# Patient Record
Sex: MALE | Race: WHITE | NOT HISPANIC OR LATINO | Employment: FULL TIME | URBAN - METROPOLITAN AREA
[De-identification: names, ages, dates, MRNs, and addresses within clinical notes are randomized per-mention and may not be internally consistent; named-entity substitution may affect disease eponyms.]

---

## 2020-01-13 ENCOUNTER — HOSPITAL ENCOUNTER (OUTPATIENT)
Dept: LAB | Facility: HOSPITAL | Age: 30
Discharge: HOME | End: 2020-01-13
Attending: FAMILY MEDICINE

## 2020-01-15 LAB — CONV NASH FIBROSURE: NORMAL

## 2022-12-01 ENCOUNTER — LAB (OUTPATIENT)
Dept: LAB | Facility: HOSPITAL | Age: 32
End: 2022-12-01

## 2022-12-01 ENCOUNTER — OFFICE VISIT (OUTPATIENT)
Dept: FAMILY MEDICINE CLINIC | Facility: CLINIC | Age: 32
End: 2022-12-01

## 2022-12-01 VITALS
RESPIRATION RATE: 19 BRPM | SYSTOLIC BLOOD PRESSURE: 106 MMHG | WEIGHT: 139.6 LBS | OXYGEN SATURATION: 98 % | HEIGHT: 71 IN | BODY MASS INDEX: 19.54 KG/M2 | HEART RATE: 83 BPM | TEMPERATURE: 98 F | DIASTOLIC BLOOD PRESSURE: 82 MMHG

## 2022-12-01 DIAGNOSIS — Z01.89 ROUTINE LAB DRAW: ICD-10-CM

## 2022-12-01 DIAGNOSIS — Z76.89 ESTABLISHING CARE WITH NEW DOCTOR, ENCOUNTER FOR: Primary | ICD-10-CM

## 2022-12-01 DIAGNOSIS — Z11.59 ENCOUNTER FOR HEPATITIS C SCREENING TEST FOR LOW RISK PATIENT: ICD-10-CM

## 2022-12-01 DIAGNOSIS — E78.1 HYPERTRIGLYCERIDEMIA: ICD-10-CM

## 2022-12-01 DIAGNOSIS — Z13.220 SCREENING FOR LIPID DISORDERS: ICD-10-CM

## 2022-12-01 DIAGNOSIS — R73.01 IMPAIRED FASTING GLUCOSE: ICD-10-CM

## 2022-12-01 DIAGNOSIS — F10.10 ALCOHOL ABUSE: ICD-10-CM

## 2022-12-01 LAB
BASOPHILS # BLD AUTO: 0.06 10*3/MM3 (ref 0–0.2)
BASOPHILS NFR BLD AUTO: 0.9 % (ref 0–1.5)
BILIRUB UR QL STRIP: NEGATIVE
CHOLEST SERPL-MCNC: 222 MG/DL (ref 0–200)
CLARITY UR: CLEAR
COLOR UR: YELLOW
DEPRECATED RDW RBC AUTO: 43.7 FL (ref 37–54)
EOSINOPHIL # BLD AUTO: 0.4 10*3/MM3 (ref 0–0.4)
EOSINOPHIL NFR BLD AUTO: 5.7 % (ref 0.3–6.2)
ERYTHROCYTE [DISTWIDTH] IN BLOOD BY AUTOMATED COUNT: 12.9 % (ref 12.3–15.4)
FOLATE SERPL-MCNC: 10.2 NG/ML (ref 4.78–24.2)
GLUCOSE UR STRIP-MCNC: NEGATIVE MG/DL
HCT VFR BLD AUTO: 46.3 % (ref 37.5–51)
HDLC SERPL-MCNC: 74 MG/DL (ref 40–60)
HGB BLD-MCNC: 15.3 G/DL (ref 13–17.7)
HGB UR QL STRIP.AUTO: NEGATIVE
IMM GRANULOCYTES # BLD AUTO: 0.02 10*3/MM3 (ref 0–0.05)
IMM GRANULOCYTES NFR BLD AUTO: 0.3 % (ref 0–0.5)
KETONES UR QL STRIP: NEGATIVE
LDLC SERPL CALC-MCNC: 121 MG/DL (ref 0–100)
LDLC/HDLC SERPL: 1.58 {RATIO}
LEUKOCYTE ESTERASE UR QL STRIP.AUTO: NEGATIVE
LYMPHOCYTES # BLD AUTO: 1.4 10*3/MM3 (ref 0.7–3.1)
LYMPHOCYTES NFR BLD AUTO: 20 % (ref 19.6–45.3)
MCH RBC QN AUTO: 30.4 PG (ref 26.6–33)
MCHC RBC AUTO-ENTMCNC: 33 G/DL (ref 31.5–35.7)
MCV RBC AUTO: 91.9 FL (ref 79–97)
MONOCYTES # BLD AUTO: 0.55 10*3/MM3 (ref 0.1–0.9)
MONOCYTES NFR BLD AUTO: 7.9 % (ref 5–12)
NEUTROPHILS NFR BLD AUTO: 4.57 10*3/MM3 (ref 1.7–7)
NEUTROPHILS NFR BLD AUTO: 65.2 % (ref 42.7–76)
NITRITE UR QL STRIP: NEGATIVE
NRBC BLD AUTO-RTO: 0 /100 WBC (ref 0–0.2)
PH UR STRIP.AUTO: 6.5 [PH] (ref 5–8)
PLATELET # BLD AUTO: 223 10*3/MM3 (ref 140–450)
PMV BLD AUTO: 10.3 FL (ref 6–12)
PROT UR QL STRIP: NEGATIVE
RBC # BLD AUTO: 5.04 10*6/MM3 (ref 4.14–5.8)
SP GR UR STRIP: 1.01 (ref 1–1.03)
TRIGL SERPL-MCNC: 155 MG/DL (ref 0–150)
TSH SERPL DL<=0.05 MIU/L-ACNC: 1.27 UIU/ML (ref 0.27–4.2)
UROBILINOGEN UR QL STRIP: NORMAL
VIT B12 BLD-MCNC: 533 PG/ML (ref 211–946)
VLDLC SERPL-MCNC: 27 MG/DL (ref 5–40)
WBC NRBC COR # BLD: 7 10*3/MM3 (ref 3.4–10.8)

## 2022-12-01 PROCEDURE — 83036 HEMOGLOBIN GLYCOSYLATED A1C: CPT

## 2022-12-01 PROCEDURE — 81003 URINALYSIS AUTO W/O SCOPE: CPT

## 2022-12-01 PROCEDURE — 82607 VITAMIN B-12: CPT

## 2022-12-01 PROCEDURE — 99204 OFFICE O/P NEW MOD 45 MIN: CPT

## 2022-12-01 PROCEDURE — 36415 COLL VENOUS BLD VENIPUNCTURE: CPT

## 2022-12-01 PROCEDURE — 80061 LIPID PANEL: CPT

## 2022-12-01 PROCEDURE — 82306 VITAMIN D 25 HYDROXY: CPT

## 2022-12-01 PROCEDURE — 82746 ASSAY OF FOLIC ACID SERUM: CPT

## 2022-12-01 PROCEDURE — 86803 HEPATITIS C AB TEST: CPT

## 2022-12-01 PROCEDURE — 80050 GENERAL HEALTH PANEL: CPT

## 2022-12-01 RX ORDER — PROPRANOLOL HYDROCHLORIDE 80 MG/1
TABLET ORAL
COMMUNITY
Start: 2020-10-10

## 2022-12-01 NOTE — ASSESSMENT & PLAN NOTE
Discussed with patient extensively about continued alcohol abstinence.  Did also discussed that it is important to take it day by day, do not try to look to far ahead, and that it is great that he has made it for 4 days.  Did provide patient to packets of resources, 1 for AA meetings that I do recommend that he attends, he can develop a good sponsor there, have some good support for people who have been through the program and can really relate to him.  Also gave him another packet, did advise him that if he has any urge to return back to alcohol, did give him a list of resources for different rehab facilities here in [facility].  Did encourage him that if he does have those urges, has difficulty in time controlling them, do recommend that he goes and be admitted to the rehab facilities for assistance.  Did also discussed with patient that we will obtain blood work, thereafter if no severe abnormalities are found, I am going to start him on Antabuse.  Discussed with him this medication, discussed with him its use, discussed with him its side effects for any alcohol consumption while on this medication.  Discussed the purpose of it.  We will also have patient follow-up in 2 weeks, sooner if needed.

## 2022-12-01 NOTE — ASSESSMENT & PLAN NOTE
We will repeat lipid panel, may consider starting patient on medication to lower this.  Did discuss with him the importance of diet changes, lifestyle modifications.

## 2022-12-01 NOTE — PROGRESS NOTES
Ghulam Guzman presents to [facility] with complaints of issues with alcohol abuse, patient is also here to establish as a new patient.      History of Present Illness  This is a 32-year-old male, past medical history significant for alcohol abuse, tremors, hypertriglyceridemia, who presents to the clinic with complaints of issues with alcohol abuse.    Alcohol abuse/tremors: States that he has had an issue with alcohol abuse since probably [id number].  States that he was 18 years old at the time, went to college, and had a pretty severe addiction to alcohol at that time.  States is only progressively gotten worse, and he is now at the point to where he is becoming extremely concerned as well as his family.  States that he currently drinks about a pint of whiskey nightly, will even drink more than that on the weekends.  States that he had his all-time low this past weekend, drank about 3/5 of whiskey, and does not remember anything else that happened this weekend.  States that he blacked out, ended up awake on his mom's couch getting IV fluids.  States that this was a big eye-opening event for him, states that he always has really wanted to come off the alcohol, but is never really had the motivation to do so.  States that he constantly has triggers, at this time of year is worse for him, as his father passed away 5 years ago in November.  States that he is trying to quit once before, only made it about 4 days, then went on a work trip and went right back to drinking.  States that he is very serious about it, knows that he needs to for various reasons, and is determined to stick with at this time.  Has never been to a rehab facility, has never been on medication to help him, but states that it is a big step for him to come to the office today to seek help.  For his tremors, states that he has had these for a long time, knows that they are alcohol induced, does see neurologist  "here in town and is currently on propranolol to help treat these.  States that he is currently been without any alcohol since Sunday, states this is day 4, this is the most that he is ever been without alcohol since 2008 2009.  Reports increased tremors, difficulty sleeping at night, but denies any seizures or seizure-like activity or any other associated symptoms.  Patient does state that he has really good family social support, has never attended a AA meeting either.    Hypertriglyceridemia: Does state that he has had elevated triglycerides in the past, states that this was done a few years ago, was never treated for this, never thought much of it, but is also not had blood work done in some time.  Is okay with us ordering this.    Patient did want flu shot today, but will defer until next appointment to make sure that he has abstained from alcohol and not actively withdrawing.  We will defer other vaccines/immunizations as well.  Otherwise, is up-to-date on preventative screenings.    Past Medical History:   Diagnosis Date   • Alcohol abuse      No past surgical history on file.    Social History     Socioeconomic History   • Marital status: Single   Tobacco Use   • Smoking status: Never   • Smokeless tobacco: Never   Vaping Use   • Vaping Use: Never used   Substance and Sexual Activity   • Alcohol use: Yes     Alcohol/week: 10.0 standard drinks     Types: 10 Shots of liquor per week   • Drug use: Never   • Sexual activity: Not Currently     Partners: Female     Birth control/protection: Condom     History reviewed. No pertinent family history.      Objective   Vital Signs:   /82   Pulse 83   Temp 98 °F (36.7 °C)   Resp 19   Ht 180.3 cm (71\")   Wt 63.3 kg (139 lb 9.6 oz)   SpO2 98%   BMI 19.47 kg/m²     Body mass index is 19.47 kg/m².    All labs, imaging, test results, and specialty provider notes reviewed with patient.       Physical Exam  Vitals reviewed.   Constitutional:       Appearance: " Normal appearance.   Cardiovascular:      Rate and Rhythm: Normal rate and regular rhythm.      Pulses: Normal pulses.      Heart sounds: Normal heart sounds.   Pulmonary:      Effort: Pulmonary effort is normal.      Breath sounds: Normal breath sounds.   Neurological:      General: No focal deficit present.      Mental Status: He is alert and oriented to person, place, and time.            Assessment and Plan:  Diagnoses and all orders for this visit:    1. Establishing care with new doctor, encounter for (Primary)    2. Routine lab draw  -     CBC Auto Differential; Future  -     Comprehensive Metabolic Panel; Future  -     TSH Rfx On Abnormal To Free T4; Future  -     Urinalysis With Culture If Indicated -; Future    3. Screening for lipid disorders  -     Lipid Panel; Future    4. Alcohol abuse  Assessment & Plan:  Discussed with patient extensively about continued alcohol abstinence.  Did also discussed that it is important to take it day by day, do not try to look to far ahead, and that it is great that he has made it for 4 days.  Did provide patient to packets of resources, 1 for AA meetings that I do recommend that he attends, he can develop a good sponsor there, have some good support for people who have been through the program and can really relate to him.  Also gave him another packet, did advise him that if he has any urge to return back to alcohol, did give him a list of resources for different rehab facilities here in town.  Did encourage him that if he does have those urges, has difficulty in time controlling them, do recommend that he goes and be admitted to the rehab facilities for assistance.  Did also discussed with patient that we will obtain blood work, thereafter if no severe abnormalities are found, I am going to start him on Antabuse.  Discussed with him this medication, discussed with him its use, discussed with him its side effects for any alcohol consumption while on this medication.   Discussed the purpose of it.  We will also have patient follow-up in 2 weeks, sooner if needed.    Orders:  -     Vitamin B12 & Folate; Future  -     Vitamin D,25-Hydroxy; Future    5. Impaired fasting glucose  -     Hemoglobin A1c; Future    6. Encounter for hepatitis C screening test for low risk patient  -     Hepatitis C antibody; Future    7. Hypertriglyceridemia  Assessment & Plan:  We will repeat lipid panel, may consider starting patient on medication to lower this.  Did discuss with him the importance of diet changes, lifestyle modifications.          Follow Up:  Return in about 2 weeks (around 12/15/2022).    Patient was given instructions and counseling regarding his condition or for health maintenance advice. Please see specific information pulled into the AVS if appropriate.

## 2022-12-02 DIAGNOSIS — F10.10 ALCOHOL ABUSE: Primary | ICD-10-CM

## 2022-12-02 LAB
25(OH)D3 SERPL-MCNC: 12.7 NG/ML (ref 30–100)
ALBUMIN SERPL-MCNC: 4.7 G/DL (ref 3.5–5.2)
ALBUMIN/GLOB SERPL: 1.4 G/DL
ALP SERPL-CCNC: 67 U/L (ref 39–117)
ALT SERPL W P-5'-P-CCNC: 31 U/L (ref 1–41)
ANION GAP SERPL CALCULATED.3IONS-SCNC: 11.1 MMOL/L (ref 5–15)
AST SERPL-CCNC: 28 U/L (ref 1–40)
BILIRUB SERPL-MCNC: 0.4 MG/DL (ref 0–1.2)
BUN SERPL-MCNC: 8 MG/DL (ref 6–20)
BUN/CREAT SERPL: 8 (ref 7–25)
CALCIUM SPEC-SCNC: 9.6 MG/DL (ref 8.6–10.5)
CHLORIDE SERPL-SCNC: 97 MMOL/L (ref 98–107)
CO2 SERPL-SCNC: 28.9 MMOL/L (ref 22–29)
CREAT SERPL-MCNC: 1 MG/DL (ref 0.76–1.27)
EGFRCR SERPLBLD CKD-EPI 2021: 102.6 ML/MIN/1.73
GLOBULIN UR ELPH-MCNC: 3.4 GM/DL
GLUCOSE SERPL-MCNC: 74 MG/DL (ref 65–99)
HBA1C MFR BLD: 4.9 % (ref 4.8–5.6)
HCV AB SER DONR QL: NORMAL
POTASSIUM SERPL-SCNC: 4.5 MMOL/L (ref 3.5–5.2)
PROT SERPL-MCNC: 8.1 G/DL (ref 6–8.5)
SODIUM SERPL-SCNC: 137 MMOL/L (ref 136–145)

## 2022-12-02 RX ORDER — DISULFIRAM 250 MG/1
250 TABLET ORAL DAILY
Qty: 30 TABLET | Refills: 1 | Status: SHIPPED | OUTPATIENT
Start: 2022-12-02 | End: 2022-12-20 | Stop reason: SDUPTHER

## 2022-12-20 ENCOUNTER — OFFICE VISIT (OUTPATIENT)
Dept: FAMILY MEDICINE CLINIC | Facility: CLINIC | Age: 32
End: 2022-12-20

## 2022-12-20 VITALS
HEIGHT: 71 IN | HEART RATE: 78 BPM | TEMPERATURE: 98 F | OXYGEN SATURATION: 99 % | RESPIRATION RATE: 20 BRPM | WEIGHT: 138.8 LBS | SYSTOLIC BLOOD PRESSURE: 102 MMHG | DIASTOLIC BLOOD PRESSURE: 82 MMHG | BODY MASS INDEX: 19.43 KG/M2

## 2022-12-20 DIAGNOSIS — R25.1 TREMOR: Primary | ICD-10-CM

## 2022-12-20 DIAGNOSIS — F10.10 ALCOHOL ABUSE: ICD-10-CM

## 2022-12-20 PROCEDURE — 99213 OFFICE O/P EST LOW 20 MIN: CPT

## 2022-12-20 RX ORDER — DISULFIRAM 250 MG/1
250 TABLET ORAL DAILY
Qty: 90 TABLET | Refills: 1 | Status: SHIPPED | OUTPATIENT
Start: 2022-12-20

## 2022-12-20 NOTE — PROGRESS NOTES
"Ghulam Guzman presents to [facility] who presents to the clinic for 2-week follow-up.      History of Present Illness  This is a 32-year-old male who presents to the clinic for 2-week follow-up.    Alcohol use: Patient states that he is now 23 days sober, this is the longest he is went a long time, and states that he is never felt better.  Patient states that the Antabuse is really helping him, because he knows that if he does take a drink of alcohol, he will immediately feel bad.  Patient states that previously he would use alcohol to try to feel good, and knows that if he was to drink now, that the Antabuse is definitely going to cause the opposite effect.  States that he feels so much better, he has noticed that he tremors that he has had are starting to improve as well, states that he can actually write with a pen and not have to type up things at work.  Definitely feels like his thinking is much clearer, does not has had much brain fog, and is really pleased with where he is at.  States that his family has seen a major improvement, his coworkers as well, is really proud of himself.  States that he also was planning on getting back into some nutrition and fitness, states that after the first of the year he is getting get in with his nutrition friend, and get back into that and hopefully build some muscle.  Patient states that he is feeling really good, is really happy with his progress, and is planning on continuing down this path.    The following portions of the patient's history were personally reviewed and updated as appropriate: allergies, current medications, past medical history, past surgical history, past family history, and past social history.       Objective   Vital Signs:   /82   Pulse 78   Temp 98 °F (36.7 °C)   Resp 20   Ht 180.3 cm (71\")   Wt 63 kg (138 lb 12.8 oz)   SpO2 99%   BMI 19.36 kg/m²     Body mass index is 19.36 kg/m².    All labs, " imaging, test results, and specialty provider notes reviewed with patient.     Physical Exam  Vitals reviewed.   Constitutional:       Appearance: Normal appearance.   Cardiovascular:      Rate and Rhythm: Normal rate and regular rhythm.      Pulses: Normal pulses.      Heart sounds: Normal heart sounds.   Pulmonary:      Effort: Pulmonary effort is normal.      Breath sounds: Normal breath sounds.   Neurological:      General: No focal deficit present.      Mental Status: He is alert and oriented to person, place, and time.            Assessment and Plan:  Diagnoses and all orders for this visit:    1. Tremor (Primary)    2. Alcohol abuse  -     disulfiram (Antabuse) 250 MG tablet; Take 1 tablet by mouth Daily.  Dispense: 90 tablet; Refill: 1      Patient doing really well with continued alcohol cessation, discussed with him that he does still have resources available here locally, did provide him a list of these at last visit.  We will continue patient on Antabuse, continue for the indefinite future as it is a good deterrent, keeps him from drinking alcohol.  Discussed with him that he can even consider once he is alcohol free for several months, even talk to his neurologist about coming off of the propranolol, as his tremors were likely related to the alcohol use.  Discussed with patient to continue with his good social support system, and that if he needs anything in the future, he is able to come by the office anytime for that support as well.    Follow Up:  Return in about 3 months (around 3/20/2023).    Patient was given instructions and counseling regarding his condition or for health maintenance advice. Please see specific information pulled into the AVS if appropriate.

## 2022-12-29 RX ORDER — METHYLPREDNISOLONE 4 MG/1
TABLET ORAL
Qty: 21 TABLET | Refills: 0 | Status: SHIPPED | OUTPATIENT
Start: 2022-12-29 | End: 2023-03-22

## 2023-01-04 ENCOUNTER — HOSPITAL ENCOUNTER (OUTPATIENT)
Dept: GENERAL RADIOLOGY | Facility: HOSPITAL | Age: 33
Discharge: HOME | End: 2023-01-04
Payer: COMMERCIAL

## 2023-01-04 ENCOUNTER — OFFICE VISIT (OUTPATIENT)
Dept: FAMILY MEDICINE CLINIC | Facility: CLINIC | Age: 33
End: 2023-01-04
Payer: COMMERCIAL

## 2023-01-04 VITALS
DIASTOLIC BLOOD PRESSURE: 80 MMHG | TEMPERATURE: 98.6 F | SYSTOLIC BLOOD PRESSURE: 114 MMHG | BODY MASS INDEX: 19.54 KG/M2 | RESPIRATION RATE: 19 BRPM | HEART RATE: 83 BPM | OXYGEN SATURATION: 94 % | WEIGHT: 139.6 LBS | HEIGHT: 71 IN

## 2023-01-04 DIAGNOSIS — M79.671 RIGHT FOOT PAIN: ICD-10-CM

## 2023-01-04 DIAGNOSIS — M79.674 TOE PAIN, RIGHT: Primary | ICD-10-CM

## 2023-01-04 DIAGNOSIS — M79.674 TOE PAIN, RIGHT: ICD-10-CM

## 2023-01-04 PROCEDURE — 73630 X-RAY EXAM OF FOOT: CPT

## 2023-01-04 PROCEDURE — 99213 OFFICE O/P EST LOW 20 MIN: CPT

## 2023-01-04 NOTE — PROGRESS NOTES
Ghulam Guzman presents to [facility] with complaints of right foot toe pain and swelling with associated right heel pain.      History of Present Illness  This is a 32-year-old male who presents to clinic with complaints of right foot toe pain and swelling with associated right heel pain.    Patient states that he does not recall an injury to this, states that he woke up 1 morning, and his toe next to his right great toe was pretty swollen, very painful, and red.  He contacted our office, I did treat him with a Medrol pack as I thought it could be related to either gout or an arthritis/joint flare, states that the prednisone pack really did make a big difference in helping control pain and swelling.  Patient states that his symptoms have persisted, now has some bruising around that toe, and even admits to some pain more toward the back part of his heel/foot.  Is concerned he could have a bone spur as well, does not recall any injury, did not hit it on anything, no reason for the symptoms to start.  States that he really just wants to make sure there is nothing acute going on there, pain wise he is doing okay on over-the-counter ibuprofen, but wants to make sure that it is okay for him to start working out as that was his plan after the new year.  Denies any fever/chills/body aches, denies any other associated symptoms.    The following portions of the patient's history were personally reviewed and updated as appropriate: allergies, current medications, past medical history, past surgical history, past family history, and past social history.       Objective   Vital Signs:   /80   Pulse 83   Temp 98.6 °F (37 °C)   Resp 19   Ht 180.3 cm (71\")   Wt 63.3 kg (139 lb 9.6 oz)   SpO2 94%   BMI 19.47 kg/m²     Body mass index is 19.47 kg/m².    All labs, imaging, test results, and specialty provider notes reviewed with patient.     Physical Exam  Vitals reviewed.    Constitutional:       Appearance: Normal appearance.   Musculoskeletal:      Right foot: Swelling present.      Comments: Ecchymosis noted on toe, mild swelling noted, no warmth.   Neurological:      General: No focal deficit present.      Mental Status: He is alert and oriented to person, place, and time.            Assessment and Plan:  Diagnoses and all orders for this visit:    1. Toe pain, right (Primary)  -     XR Foot 3+ View Right; Future    2. Right foot pain  -     XR Foot 3+ View Right; Future      Due to persistent swelling and symptoms, we will go ahead and obtain an x-ray to rule out any abnormalities there that could be contributing to patient's symptoms.  Still think that this is related to an arthritis/gout flare, discussed him to continue taking ibuprofen over-the-counter, but will rule out any other abnormalities via x-ray.    Follow Up:  No follow-ups on file.    Patient was given instructions and counseling regarding his condition or for health maintenance advice. Please see specific information pulled into the AVS if appropriate.

## 2023-03-22 ENCOUNTER — OFFICE VISIT (OUTPATIENT)
Dept: FAMILY MEDICINE CLINIC | Facility: CLINIC | Age: 33
End: 2023-03-22
Payer: COMMERCIAL

## 2023-03-22 VITALS
BODY MASS INDEX: 18.45 KG/M2 | HEART RATE: 95 BPM | HEIGHT: 71 IN | DIASTOLIC BLOOD PRESSURE: 72 MMHG | OXYGEN SATURATION: 98 % | SYSTOLIC BLOOD PRESSURE: 112 MMHG | RESPIRATION RATE: 19 BRPM | WEIGHT: 131.8 LBS

## 2023-03-22 DIAGNOSIS — E78.1 HYPERTRIGLYCERIDEMIA: ICD-10-CM

## 2023-03-22 DIAGNOSIS — G25.0 ESSENTIAL TREMOR: ICD-10-CM

## 2023-03-22 DIAGNOSIS — F10.10 ALCOHOL ABUSE: ICD-10-CM

## 2023-03-22 DIAGNOSIS — M19.90 ARTHRITIS: Primary | ICD-10-CM

## 2023-03-22 NOTE — PROGRESS NOTES
Ghulam Guzman presents to [facility] who presents to clinic for 3-month follow-up.      History of Present Illness  This is a 32-year-old male who presents to clinic for 3-month follow-up.    Alcohol use: Patient states that he is currently 115 days sober, states that he feels so much better, states that his sleeping patterns have actually regulated.  States that it did take about 2 to 2-1/2 months, but does finally feel like he is got a better sleep habit as well.  Has developed other good routines, is getting back into the gym, is more active and overall feels much better 2.  Patient states that he also follows up with his neurologist tomorrow, has been weaning himself off slowly of the propranolol, as he knows that the reason for his tremors was because of the alcohol.  He is good to try to come off of this completely.  Does remain on Antabuse currently, but states that he is probably getting caught stop this soon as he feels like he is got good routines and does not need it any longer.  States that he may go back on it around December, because he seems to have worsening issues with wanting to drink around the holidays.  Overall is doing really well.  No other new acute issues.    Right foot arthritis: Was found to have arthritis via x-ray at last visit.  Patient states that he does still have quite a bit of pain, but it is intermittent, and wants to hold off on any further work-up of this at this time as the x-ray was pretty expensive.  Has been taking ibuprofen intermittently, still has the pain periodically, and even at times whenever he is put a lot of pressure on his foot he notices a popping sensation.  States that he is doing pretty well with conservative measures, would like to hold off on any further assessment of this at this time.    The following portions of the patient's history were personally reviewed and updated as appropriate: allergies, current  "medications, past medical history, past surgical history, past family history, and past social history.       Objective   Vital Signs:   /72   Pulse 95   Resp 19   Ht 180.3 cm (71\")   Wt 59.8 kg (131 lb 12.8 oz)   SpO2 98%   BMI 18.38 kg/m²     Body mass index is 18.38 kg/m².    All labs, imaging, test results, and specialty provider notes reviewed with patient.     Physical Exam  Vitals reviewed.   Constitutional:       Appearance: Normal appearance.   Cardiovascular:      Rate and Rhythm: Normal rate and regular rhythm.      Pulses: Normal pulses.      Heart sounds: Normal heart sounds.   Pulmonary:      Effort: Pulmonary effort is normal.      Breath sounds: Normal breath sounds.   Neurological:      General: No focal deficit present.      Mental Status: He is alert and oriented to person, place, and time.                Assessment and Plan:  Diagnoses and all orders for this visit:    1. Arthritis (Primary)  Comments:  Has had x-ray, do think it is likely gout, continue with ibuprofen as needed.  Can consider blood work to test for gout in future.    2. Alcohol abuse  Comments:  Doing well, has been sober for 115 days.  Continue Antabuse as needed.    3. Hypertriglyceridemia  Comments:  We will repeat lipid panel prior to next visit.  Continue lifestyle modifications, diet adjustments and increased exercise.    4. Essential tremor  Comments:  Continued on propranolol, follow-up with neurology.          Follow Up:  Return in about 9 months (around 12/22/2023).    Patient was given instructions and counseling regarding his condition or for health maintenance advice. Please see specific information pulled into the AVS if appropriate.       "

## 2023-08-15 ENCOUNTER — OFFICE VISIT (OUTPATIENT)
Dept: FAMILY MEDICINE CLINIC | Facility: CLINIC | Age: 33
End: 2023-08-15
Payer: COMMERCIAL

## 2023-08-15 VITALS
SYSTOLIC BLOOD PRESSURE: 126 MMHG | HEART RATE: 68 BPM | WEIGHT: 131.8 LBS | DIASTOLIC BLOOD PRESSURE: 88 MMHG | HEIGHT: 71 IN | BODY MASS INDEX: 18.45 KG/M2 | TEMPERATURE: 97.7 F | OXYGEN SATURATION: 98 %

## 2023-08-15 DIAGNOSIS — F10.982 ALCOHOL-INDUCED INSOMNIA: Primary | ICD-10-CM

## 2023-08-15 DIAGNOSIS — G25.0 ESSENTIAL TREMOR: ICD-10-CM

## 2023-08-15 DIAGNOSIS — F10.10 ALCOHOL ABUSE: ICD-10-CM

## 2023-08-15 PROCEDURE — 99213 OFFICE O/P EST LOW 20 MIN: CPT

## 2023-08-15 RX ORDER — PROPRANOLOL HYDROCHLORIDE 120 MG/1
120 CAPSULE, EXTENDED RELEASE ORAL DAILY
COMMUNITY

## 2023-08-15 RX ORDER — AMITRIPTYLINE HYDROCHLORIDE 10 MG/1
10-20 TABLET, FILM COATED ORAL NIGHTLY
Qty: 60 TABLET | Refills: 2 | Status: SHIPPED | OUTPATIENT
Start: 2023-08-15

## 2023-08-15 NOTE — PROGRESS NOTES
"Ghulam Guzman presents to [facility] who presents to the clinic for 1 month follow-up.      History of Present Illness  This is a 33-year-old male who presents to the clinic for 1 month follow-up.    Patient states that he has only had 1 drink in the past 8 days, states that the 1 drink was at a social event during a football game, and patient states that he is really proud of himself.  States that the insomnia though, has not really improved.  He did try the hydroxyzine that was prescribed at last visit, states that he started with 1 tablet, did not do anything for him, then started to take 2.  Patient states that it did help him fall asleep, but he was in a very light sleep and any sudden movement seem to trigger him to wake up.  Patient also states that he was having more of a dreams, and thus was not getting a good nights rest.  States that he is really trying hard to not go back to alcohol as a form of helping him sleep, and thus would like to try another medication to help him sleep in the meantime.  Has not ever tried any other medication, has tried melatonin over-the-counter as well with no resolve.  States that he also is still taking his propranolol for his tremors, states that he is back up to 120 mg due to the severity of his tremors related to the alcohol.  He denies any other symptoms.    The following portions of the patient's history were personally reviewed and updated as appropriate: allergies, current medications, past medical history, past surgical history, past family history, and past social history.       Objective   Vital Signs:   /88 (BP Location: Left arm, Patient Position: Sitting, Cuff Size: Adult)   Pulse 68   Temp 97.7 øF (36.5 øC) (Temporal)   Ht 180.3 cm (71\")   Wt 59.8 kg (131 lb 12.8 oz)   SpO2 98%   BMI 18.38 kg/mý     Body mass index is 18.38 kg/mý.    All labs, imaging, test results, and specialty provider notes reviewed with " patient.     Physical Exam  Vitals reviewed.   Constitutional:       Appearance: Normal appearance.   Cardiovascular:      Rate and Rhythm: Normal rate and regular rhythm.      Pulses: Normal pulses.      Heart sounds: Normal heart sounds.   Pulmonary:      Effort: Pulmonary effort is normal.      Breath sounds: Normal breath sounds.   Neurological:      General: No focal deficit present.      Mental Status: He is alert and oriented to person, place, and time.                       Assessment and Plan:  Diagnoses and all orders for this visit:    1. Alcohol-induced insomnia (Primary)  Comments:  At this point we will try on amitriptyline and to help with sleep and may be tremors.  Discussed medication, side effects, and use.  Orders:  -     amitriptyline (ELAVIL) 10 MG tablet; Take 1-2 tablets by mouth Every Night.  Dispense: 60 tablet; Refill: 2    2. Essential tremor  -     amitriptyline (ELAVIL) 10 MG tablet; Take 1-2 tablets by mouth Every Night.  Dispense: 60 tablet; Refill: 2    3. Alcohol abuse  Comments:  Discussed continued alcohol cessation, as it is likely contributing to insomnia.  Once again, patient does not want Antabuse.          Follow Up:  Return in about 4 weeks (around 9/12/2023).    Patient was given instructions and counseling regarding his condition or for health maintenance advice. Please see specific information pulled into the AVS if appropriate.

## 2023-09-14 ENCOUNTER — OFFICE VISIT (OUTPATIENT)
Dept: FAMILY MEDICINE CLINIC | Facility: CLINIC | Age: 33
End: 2023-09-14
Payer: COMMERCIAL

## 2023-09-14 VITALS
SYSTOLIC BLOOD PRESSURE: 122 MMHG | DIASTOLIC BLOOD PRESSURE: 70 MMHG | TEMPERATURE: 98.2 F | OXYGEN SATURATION: 98 % | BODY MASS INDEX: 18.56 KG/M2 | WEIGHT: 132.6 LBS | HEIGHT: 71 IN | HEART RATE: 80 BPM

## 2023-09-14 DIAGNOSIS — G25.0 ESSENTIAL TREMOR: ICD-10-CM

## 2023-09-14 DIAGNOSIS — F10.982 ALCOHOL-INDUCED INSOMNIA: Primary | ICD-10-CM

## 2023-09-14 PROCEDURE — 99213 OFFICE O/P EST LOW 20 MIN: CPT

## 2023-09-14 RX ORDER — QUETIAPINE FUMARATE 25 MG/1
25-50 TABLET, FILM COATED ORAL NIGHTLY
Qty: 180 TABLET | Refills: 1 | Status: SHIPPED | OUTPATIENT
Start: 2023-09-14

## 2023-09-14 NOTE — PROGRESS NOTES
"Ghulam Guzman presents to [facility] presents to the clinic for 4-week follow-up.      Insomnia    This is a 33-year-old male who presents to the clinic for 4-week follow-up.    Alcoholism/insomnia: Patient states that he is done pretty well with abstaining from alcohol, states that he is no longer had it in his house, and that he feels like he is doing pretty well.  Patient states that he is not getting any sleep, states that the medication that we started him on has not helped, he is even taken 2 of them at one time and is still not made much of a difference.  States that he has trouble falling asleep, and then will wake up from 2 to 3 AM, and then would not be able to go back to sleep after that.  States that it is really impacting him, and that he is afraid that if he does not start getting better sleep that he is can go back to drinking alcohol.  Denies any other issues.    The following portions of the patient's history were personally reviewed and updated as appropriate: allergies, current medications, past medical history, past surgical history, past family history, and past social history.       Objective   Vital Signs:   /70 (BP Location: Left arm, Patient Position: Sitting)   Pulse 80   Temp 98.2 °F (36.8 °C) (Temporal)   Ht 180.3 cm (71\")   Wt 60.1 kg (132 lb 9.6 oz)   SpO2 98%   BMI 18.49 kg/m²     Body mass index is 18.49 kg/m².    All labs, imaging, test results, and specialty provider notes reviewed with patient.     Physical Exam  Vitals reviewed.   Constitutional:       Appearance: Normal appearance.   Cardiovascular:      Rate and Rhythm: Normal rate and regular rhythm.      Pulses: Normal pulses.      Heart sounds: Normal heart sounds.   Pulmonary:      Effort: Pulmonary effort is normal.      Breath sounds: Normal breath sounds.   Neurological:      General: No focal deficit present.      Mental Status: He is alert and oriented to person, " place, and time.                BMI is below normal parameters (malnutrition). Recommendations: treating the underlying disease process            Assessment and Plan:  Diagnoses and all orders for this visit:    1. Alcohol-induced insomnia (Primary)  -     QUEtiapine (SEROquel) 25 MG tablet; Take 1-2 tablets by mouth Every Night.  Dispense: 180 tablet; Refill: 1    2. Essential tremor    At this point, we will try him on a Seroquel.  Hopefully this will benefit him from an anxiety/depression standpoint as he does probably have some of this underlying as well that leads him to drinking alcohol.  Discussed with him this medication, possible side effects, and use.  We will start with 1 tablet nightly, but then discussed that he can go up to 2 tablets nightly to see if that will improve.  Patient to let me know in 1 month how he is doing with this new medication.      Follow Up:  Return in about 3 months (around 12/14/2023).    Patient was given instructions and counseling regarding his condition or for health maintenance advice. Please see specific information pulled into the AVS if appropriate.

## 2023-10-30 RX ORDER — METHYLPREDNISOLONE 4 MG/1
TABLET ORAL
Qty: 21 TABLET | Refills: 0 | Status: SHIPPED | OUTPATIENT
Start: 2023-10-30

## 2023-11-20 RX ORDER — METHYLPREDNISOLONE 4 MG/1
TABLET ORAL
Qty: 21 TABLET | Refills: 0 | Status: SHIPPED | OUTPATIENT
Start: 2023-11-20

## 2023-12-14 ENCOUNTER — OFFICE VISIT (OUTPATIENT)
Dept: FAMILY MEDICINE CLINIC | Facility: CLINIC | Age: 33
End: 2023-12-14
Payer: MEDICAID

## 2023-12-14 VITALS
HEART RATE: 98 BPM | WEIGHT: 133.2 LBS | HEIGHT: 71 IN | OXYGEN SATURATION: 97 % | BODY MASS INDEX: 18.65 KG/M2 | SYSTOLIC BLOOD PRESSURE: 102 MMHG | TEMPERATURE: 97.4 F | DIASTOLIC BLOOD PRESSURE: 68 MMHG

## 2023-12-14 DIAGNOSIS — M10.49 OTHER SECONDARY ACUTE GOUT OF MULTIPLE SITES: ICD-10-CM

## 2023-12-14 DIAGNOSIS — G25.0 ESSENTIAL TREMOR: ICD-10-CM

## 2023-12-14 DIAGNOSIS — F10.982 ALCOHOL-INDUCED INSOMNIA: Primary | ICD-10-CM

## 2023-12-14 DIAGNOSIS — Z00.00 ANNUAL PHYSICAL EXAM: ICD-10-CM

## 2023-12-14 PROBLEM — M10.9 GOUT: Status: ACTIVE | Noted: 2023-12-14

## 2023-12-14 RX ORDER — QUETIAPINE FUMARATE 25 MG/1
25-50 TABLET, FILM COATED ORAL NIGHTLY
Qty: 180 TABLET | Refills: 1 | Status: SHIPPED | OUTPATIENT
Start: 2023-12-14

## 2023-12-14 NOTE — PROGRESS NOTES
Ghulam Guzman presents to [facility] who presents to the clinic for 3-month follow-up.      History of Present Illness  This is a 33-year-old male who presents to clinic for 3-month follow-up.    Alcohol abuse/insomnia/gout: Patient states that he has completely quit alcohol use since the beginning of November.  Patient states that he found that it was definitely contributing to a lot of his chronic issues, and at this point, has decided to quit all alcohol.  About 1 years ago, he did come and see me, and was ready to quit alcohol.  Had quit hard alcohol at that time, but over the past couple of months he had gradually went back to drinking beer only.  Patient states that he has completely quit all alcohol now, feels as though it has caused issues with gout flaring especially in his right hand.  Patient states that his right hand knuckle would get extremely swollen, he was given to Medrol Dosepak's which did help a little bit, but then ultimately eliminating things from his diet that contributed to the gout is what ultimately is helping things get better.  Patient states that he has completely gotten rid of all things purine, has limited all alcohol, and is working really hard to control this.  Patient also states he is doing really well on the Seroquel, states that this is the best medication and is actually getting about 10 hours of sleep.  No new issues there, would like to continue on this medication.  Also was continuing on his propranolol for his tremors.    Patient does not wish to have blood work done at this time, otherwise up-to-date on other preventative screenings.    The following portions of the patient's history were personally reviewed and updated as appropriate: allergies, current medications, past medical history, past surgical history, past family history, and past social history.       Objective   Vital Signs:   /68 (BP Location: Left arm, Patient  "Position: Sitting, Cuff Size: Adult)   Pulse 98   Temp 97.4 °F (36.3 °C) (Temporal)   Ht 180.3 cm (71\")   Wt 60.4 kg (133 lb 3.2 oz)   SpO2 97%   BMI 18.58 kg/m²     Body mass index is 18.58 kg/m².    All labs, imaging, test results, and specialty provider notes reviewed with patient.     Physical Exam  Vitals reviewed.   Constitutional:       Appearance: Normal appearance.   Cardiovascular:      Rate and Rhythm: Normal rate and regular rhythm.      Pulses: Normal pulses.      Heart sounds: Normal heart sounds.   Pulmonary:      Effort: Pulmonary effort is normal.      Breath sounds: Normal breath sounds.   Neurological:      General: No focal deficit present.      Mental Status: He is alert and oriented to person, place, and time.                  BMI is within normal parameters. No other follow-up for BMI required.            Assessment and Plan:  Diagnoses and all orders for this visit:    1. Alcohol-induced insomnia (Primary)  Assessment & Plan:  Doing well with alcohol cessation and on Seroquel.  Will continue at current dose.    Orders:  -     QUEtiapine (SEROquel) 25 MG tablet; Take 1-2 tablets by mouth Every Night.  Dispense: 180 tablet; Refill: 1    2. Essential tremor    3. Other secondary acute gout of multiple sites  Assessment & Plan:  Continue with low purine diet, alcohol cessation is very important as this is likely trigger for worsening gout symptoms.  Could consider allopurinol in the future if gout becomes a worsening issue.      4. Annual physical exam      Anticipatory Guidelines discussed with patient.    Discussed getting adequate exercise, reduced TV/electronic time, car safety including seat belt use, sexual activity (if applicable), and smoking/alcohol use (if applicable).      Follow Up:  Return in about 6 months (around 6/14/2024).    Patient was given instructions and counseling regarding his condition or for health maintenance advice. Please see specific information pulled into " the AVS if appropriate.

## 2023-12-14 NOTE — ASSESSMENT & PLAN NOTE
Continue with low purine diet, alcohol cessation is very important as this is likely trigger for worsening gout symptoms.  Could consider allopurinol in the future if gout becomes a worsening issue.

## 2024-02-15 RX ORDER — METHYLPREDNISOLONE 4 MG/1
TABLET ORAL
Qty: 21 TABLET | Refills: 0 | Status: SHIPPED | OUTPATIENT
Start: 2024-02-15

## 2024-02-27 DIAGNOSIS — M79.674 TOE PAIN, RIGHT: Primary | ICD-10-CM

## 2024-02-27 DIAGNOSIS — M1A.2791: ICD-10-CM

## 2024-03-08 ENCOUNTER — HOSPITAL ENCOUNTER (OUTPATIENT)
Dept: MRI IMAGING | Facility: HOSPITAL | Age: 34
Discharge: HOME | End: 2024-03-08
Payer: MEDICAID

## 2024-03-08 ENCOUNTER — OFFICE VISIT (OUTPATIENT)
Dept: PODIATRY | Facility: CLINIC | Age: 34
End: 2024-03-08
Payer: MEDICAID

## 2024-03-08 VITALS
OXYGEN SATURATION: 99 % | SYSTOLIC BLOOD PRESSURE: 147 MMHG | TEMPERATURE: 98.4 F | WEIGHT: 139 LBS | BODY MASS INDEX: 19.39 KG/M2 | DIASTOLIC BLOOD PRESSURE: 95 MMHG | HEART RATE: 86 BPM

## 2024-03-08 DIAGNOSIS — M86.172 OTHER ACUTE OSTEOMYELITIS OF LEFT FOOT: ICD-10-CM

## 2024-03-08 DIAGNOSIS — M79.672 FOOT PAIN, LEFT: ICD-10-CM

## 2024-03-08 DIAGNOSIS — M10.9 ACUTE GOUT INVOLVING TOE OF LEFT FOOT, UNSPECIFIED CAUSE: Primary | ICD-10-CM

## 2024-03-08 PROCEDURE — 0 GADOBENATE DIMEGLUMINE 529 MG/ML SOLUTION: Performed by: PODIATRIST

## 2024-03-08 PROCEDURE — 73720 MRI LWR EXTREMITY W/O&W/DYE: CPT

## 2024-03-08 PROCEDURE — A9577 INJ MULTIHANCE: HCPCS | Performed by: PODIATRIST

## 2024-03-08 RX ORDER — DOXYCYCLINE HYCLATE 100 MG/1
100 CAPSULE ORAL 2 TIMES DAILY
Qty: 28 CAPSULE | Refills: 0 | Status: SHIPPED | OUTPATIENT
Start: 2024-03-08 | End: 2024-03-22

## 2024-03-08 RX ADMIN — GADOBENATE DIMEGLUMINE 15 ML: 529 INJECTION, SOLUTION INTRAVENOUS at 10:35

## 2024-03-08 NOTE — PROGRESS NOTES
[facility] - PODIATRY    Today's Date: 03/08/24    Patient Name: Ghulam Guzman  MRN: [id number]  CSN: [id number]  PCP: Flora Franco APRN  Referring Provider: Flora Franco APRN    SUBJECTIVE     Chief Complaint   Patient presents with    Left Foot - Establish Care, Pain, Gout    Right Foot - Establish Care, Pain, Gout     HPI: Ghulam Guzman, a 33 y.o.male, presents to clinic.    New, Established, New Problem: New    Location: Left hallux    Duration:  one week    Onset: Insidious    Nature: Patient states having gout at which started on his right foot 2 weeks ago in his left foot started 1 week ago.  States he was treated with p.o. prednisone but his left foot has continued to be a problem    Aggravating factors:  Patient relates pain is aggravated by shoe gear and ambulation.      Patient denies any fevers, chills, nausea, vomiting, shortness of breath, nor any other constitutional signs nor symptoms.    No other pedal complaints at this time.    Past Medical History:   Diagnosis Date    Alcohol abuse      History reviewed. No pertinent surgical history.  History reviewed. No pertinent family history.  Social History     Socioeconomic History    Marital status: Single   Tobacco Use    Smoking status: Never     Passive exposure: Never    Smokeless tobacco: Never   Vaping Use    Vaping status: Never Used   Substance and Sexual Activity    Alcohol use: Not Currently     Comment: Sober 39 days Stopped drinking 11/27/2022    Drug use: Never    Sexual activity: Not Currently     Partners: Female     Birth control/protection: Condom     No Known Allergies  Current Outpatient Medications   Medication Sig Dispense Refill    propranolol LA (INDERAL LA) 120 MG 24 hr capsule Take 1 capsule by mouth Daily.      QUEtiapine (SEROquel) 25 MG tablet Take 1-2 tablets by mouth Every Night. 180 tablet 1    doxycycline (VIBRAMYCIN) 100 MG capsule Take 1 capsule by mouth 2 (Two) Times a Day for  14 days. 28 capsule 0    methylPREDNISolone (MEDROL) 4 MG dose pack Take as directed on package instructions. (Patient not taking: Reported on 3/8/2024) 21 tablet 0     No current facility-administered medications for this visit.     Review of Systems   Constitutional: Negative.    Musculoskeletal:         Left great toe   All other systems reviewed and are negative.      OBJECTIVE     Vitals:    03/08/24 0741   BP: 147/95   Pulse: 86   Temp: 98.4 °F (36.9 °C)   SpO2: 99%       WBC   Date Value Ref Range Status   12/01/2022 7.00 3.40 - 10.80 10*3/mm3 Final     RBC   Date Value Ref Range Status   12/01/2022 5.04 4.14 - 5.80 10*6/mm3 Final     Hemoglobin   Date Value Ref Range Status   12/01/2022 15.3 13.0 - 17.7 g/dL Final     Hematocrit   Date Value Ref Range Status   12/01/2022 46.3 37.5 - 51.0 % Final     MCV   Date Value Ref Range Status   12/01/2022 91.9 79.0 - 97.0 fL Final     MCH   Date Value Ref Range Status   12/01/2022 30.4 26.6 - 33.0 pg Final     MCHC   Date Value Ref Range Status   12/01/2022 33.0 31.5 - 35.7 g/dL Final     RDW   Date Value Ref Range Status   12/01/2022 12.9 12.3 - 15.4 % Final     RDW-SD   Date Value Ref Range Status   12/01/2022 43.7 37.0 - 54.0 fl Final     MPV   Date Value Ref Range Status   12/01/2022 10.3 6.0 - 12.0 fL Final     Platelets   Date Value Ref Range Status   12/01/2022 223 140 - 450 10*3/mm3 Final     Neutrophil %   Date Value Ref Range Status   12/01/2022 65.2 42.7 - 76.0 % Final     Lymphocyte %   Date Value Ref Range Status   12/01/2022 20.0 19.6 - 45.3 % Final     Monocyte %   Date Value Ref Range Status   12/01/2022 7.9 5.0 - 12.0 % Final     Eosinophil %   Date Value Ref Range Status   12/01/2022 5.7 0.3 - 6.2 % Final     Basophil %   Date Value Ref Range Status   12/01/2022 0.9 0.0 - 1.5 % Final     Immature Grans %   Date Value Ref Range Status   12/01/2022 0.3 0.0 - 0.5 % Final     Neutrophils, Absolute   Date Value Ref Range Status   12/01/2022 4.57 1.70 -  7.00 10*3/mm3 Final     Lymphocytes, Absolute   Date Value Ref Range Status   12/01/2022 1.40 0.70 - 3.10 10*3/mm3 Final     Monocytes, Absolute   Date Value Ref Range Status   12/01/2022 0.55 0.10 - 0.90 10*3/mm3 Final     Eosinophils, Absolute   Date Value Ref Range Status   12/01/2022 0.40 0.00 - 0.40 10*3/mm3 Final     Basophils, Absolute   Date Value Ref Range Status   12/01/2022 0.06 0.00 - 0.20 10*3/mm3 Final     Immature Grans, Absolute   Date Value Ref Range Status   12/01/2022 0.02 0.00 - 0.05 10*3/mm3 Final     nRBC   Date Value Ref Range Status   12/01/2022 0.0 0.0 - 0.2 /100 WBC Final         Lab Results   Component Value Date    GLUCOSE 74 12/01/2022    BUN 8 12/01/2022    CREATININE 1.00 12/01/2022    EGFR 102.6 12/01/2022    BCR 8.0 12/01/2022    K 4.5 12/01/2022    CO2 28.9 12/01/2022    CALCIUM 9.6 12/01/2022    ALBUMIN 4.70 12/01/2022    BILITOT 0.4 12/01/2022    AST 28 12/01/2022    ALT 31 12/01/2022       Patient seen in no apparent distress.      PHYSICAL EXAM:     Foot/Ankle Exam    GENERAL  Appearance:  appears stated age  Orientation:  AAOx3  Affect:  appropriate  Gait:  unimpaired  Assistance:  independent  Right shoe gear: casual shoe  Left shoe gear: casual shoe    VASCULAR     Right Foot Vascularity   Normal vascular exam    Dorsalis pedis:  2+  Posterior tibial:  2+  Skin temperature:  warm  Edema grading:  None  CFT:  < 3 seconds  Pedal hair growth:  Present  Varicosities:  none     Left Foot Vascularity   Normal vascular exam    Dorsalis pedis:  2+  Posterior tibial:  2+  Skin temperature:  warm  Edema grading:  None  CFT:  < 3 seconds  Pedal hair growth:  Present  Varicosities:  none     NEUROLOGIC     Right Foot Neurologic   Normal sensation    Light touch sensation: normal  Vibratory sensation: normal  Hot/Cold sensation: normal  Protective Sensation using [place]-Dre Monofilament:   Sites intact: 10  Sites tested: 10     Left Foot Neurologic   Normal sensation    Light touch  sensation: normal  Vibratory sensation: normal  Hot/Cold sensation:  normal  Protective Sensation using [place]-Dre Monofilament:   Sites intact: 10  Sites tested: 10    MUSCULOSKELETAL     Left Foot Musculoskeletal   Tenderness:  toe 1 tenderness    MUSCLE STRENGTH     Right Foot Muscle Strength   Foot dorsiflexion:  4  Foot plantar flexion:  4  Foot inversion:  4  Foot eversion:  4     Left Foot Muscle Strength   Foot dorsiflexion:  4  Foot plantar flexion:  4  Foot inversion:  4  Foot eversion:  4    RANGE OF MOTION     Right Foot Range of Motion   Foot and ankle ROM within normal limits       Left Foot Range of Motion   Foot and ankle ROM within normal limits      DERMATOLOGIC      Right Foot Dermatologic   Skin  Right foot skin is intact.      Left Foot Dermatologic   Skin  Positive for cellulitis and erythema.       RADIOLOGY:      XR Foot 3+ View Left    Result Date: 3/8/2024  Narrative: IN-OFFICE IMAGING:  Standing, weightbearing, 3 view, AP, MO, Lateral, Left foot Indication:  Foot Pain Findings: Osteolytic changes seen in the lateral aspect of the proximal phalanx of the hallux periosteal changes.  These are concerning for osteomyelitis.  Otherwise, no periosteal reactions nor osteolytic changes seen.  No occult fractures seen. Comparison: No comparison views available. Dr. Loera called and spoke with Dr. Tal ePpper, radiologist with [facility].  Dr. Pepper reviewed these images and states agreement with these findings.     ASSESSMENT/PLAN     Diagnoses and all orders for this visit:    1. Acute gout involving toe of left foot, unspecified cause (Primary)    2. Other acute osteomyelitis of left foot  -     Cancel: MRI Foot Left With & Without Contrast; Future  -     MRI Foot Left With & Without Contrast; Future  -     doxycycline (VIBRAMYCIN) 100 MG capsule; Take 1 capsule by mouth 2 (Two) Times a Day for 14 days.  Dispense: 28 capsule; Refill: 0    3. Foot pain,  left        Comprehensive lower extremity examination and evaluation was performed.    Discussed findings and treatment plan including risks, benefits, and treatment options with patient in detail. Patient agreed with treatment plan.    Medications and allergies reviewed.  Reviewed available lab values along with other pertinent labs.  These were discussed with the patient.    An After Visit Summary was printed and given to the patient at discharge, including (if requested) any available informative/educational handouts regarding diagnosis, treatment, or medications. All questions were answered to patient/family satisfaction. Should symptoms fail to improve or worsen they agree to call or return to clinic or to go to the Emergency Department. Discussed the importance of following up with any needed screening tests/labs/specialist appointments and any requested follow-up recommended by me today. Importance of maintaining follow-up discussed and patient accepts that missed appointments can delay diagnosis and potentially lead to worsening of conditions.    Return for MRI f/u., or sooner if acute issues arise.    This document has been electronically signed by Howard Loera DPM on March 8, 2024 08:54 EST

## 2024-03-08 NOTE — LETTER
March 8, 2024       No Recipients    Patient: Ghulam Guzman   YOB: 1990   Date of Visit: 3/8/2024       Dear TAMIKO Santiago:    Thank you for referring Ghulam Guzman to me for evaluation. Below are the relevant portions of my assessment and plan of care.    Encounter Diagnosis and Orders:  Diagnoses and all orders for this visit:    1. Acute gout involving toe of left foot, unspecified cause (Primary)    2. Other acute osteomyelitis of left foot  -     Cancel: MRI Foot Left With & Without Contrast; Future  -     MRI Foot Left With & Without Contrast; Future  -     doxycycline (VIBRAMYCIN) 100 MG capsule; Take 1 capsule by mouth 2 (Two) Times a Day for 14 days.  Dispense: 28 capsule; Refill: 0    3. Foot pain, left        If you have questions, please do not hesitate to call me. I look forward to following Ghulam along with you.         Sincerely,        Howard Loera DPM        CC:   No Recipients

## 2024-03-11 ENCOUNTER — OFFICE VISIT (OUTPATIENT)
Dept: PODIATRY | Facility: CLINIC | Age: 34
End: 2024-03-11
Payer: MEDICAID

## 2024-03-11 VITALS
BODY MASS INDEX: 19.39 KG/M2 | SYSTOLIC BLOOD PRESSURE: 128 MMHG | WEIGHT: 139 LBS | DIASTOLIC BLOOD PRESSURE: 91 MMHG | HEART RATE: 86 BPM | OXYGEN SATURATION: 97 % | TEMPERATURE: 98.6 F

## 2024-03-11 DIAGNOSIS — M10.9 ACUTE GOUT INVOLVING TOE OF LEFT FOOT, UNSPECIFIED CAUSE: Primary | ICD-10-CM

## 2024-03-11 DIAGNOSIS — M79.672 FOOT PAIN, LEFT: ICD-10-CM

## 2024-03-11 PROCEDURE — 1160F RVW MEDS BY RX/DR IN RCRD: CPT | Performed by: PODIATRIST

## 2024-03-11 PROCEDURE — 1159F MED LIST DOCD IN RCRD: CPT | Performed by: PODIATRIST

## 2024-03-11 PROCEDURE — 99213 OFFICE O/P EST LOW 20 MIN: CPT | Performed by: PODIATRIST

## 2024-03-11 RX ORDER — METHYLPREDNISOLONE 4 MG/1
TABLET ORAL
Qty: 21 TABLET | Refills: 0 | Status: SHIPPED | OUTPATIENT
Start: 2024-03-11

## 2024-03-11 NOTE — PROGRESS NOTES
[facility] - PODIATRY    Today's Date: 03/11/24    Patient Name: Ghulam Guzman  MRN: [id number]  CSN: [id number]  PCP: Flora Franco APRN  Referring Provider: No ref. provider found    SUBJECTIVE     Chief Complaint   Patient presents with    Left Foot - Follow-up, Osteomyelitis      Here for MRI results     HPI: Ghulam Guzman, a 33 y.o.male, presents to clinic.    New, Established, New Problem: est    Location: Left hallux    Duration:  one week    Onset: Insidious    Aggravating factors:  Patient relates pain is aggravated by shoe gear and ambulation.      Patient denies any fevers, chills, nausea, vomiting, shortness of breath, nor any other constitutional signs nor symptoms.    No other pedal complaints at this time.    Past Medical History:   Diagnosis Date    Alcohol abuse      History reviewed. No pertinent surgical history.  History reviewed. No pertinent family history.  Social History     Socioeconomic History    Marital status: Single   Tobacco Use    Smoking status: Never     Passive exposure: Never    Smokeless tobacco: Never   Vaping Use    Vaping status: Never Used   Substance and Sexual Activity    Alcohol use: Not Currently     Comment: Sober 39 days Stopped drinking 11/27/2022    Drug use: Never    Sexual activity: Not Currently     Partners: Female     Birth control/protection: Condom     No Known Allergies  Current Outpatient Medications   Medication Sig Dispense Refill    doxycycline (VIBRAMYCIN) 100 MG capsule Take 1 capsule by mouth 2 (Two) Times a Day for 14 days. 28 capsule 0    propranolol LA (INDERAL LA) 120 MG 24 hr capsule Take 1 capsule by mouth Daily.      QUEtiapine (SEROquel) 25 MG tablet Take 1-2 tablets by mouth Every Night. 180 tablet 1    methylPREDNISolone (MEDROL) 4 MG dose pack Take as directed on package instructions. (Patient not taking: Reported on 3/8/2024) 21 tablet 0    methylPREDNISolone (MEDROL) 4 MG dose pack Take as directed 21  tablet 0     No current facility-administered medications for this visit.     Review of Systems   Constitutional: Negative.    Musculoskeletal:         Left great toe   All other systems reviewed and are negative.      OBJECTIVE     Vitals:    03/11/24 1249   BP: 128/91   Pulse: 86   Temp: 98.6 °F (37 °C)   SpO2: 97%       WBC   Date Value Ref Range Status   12/01/2022 7.00 3.40 - 10.80 10*3/mm3 Final     RBC   Date Value Ref Range Status   12/01/2022 5.04 4.14 - 5.80 10*6/mm3 Final     Hemoglobin   Date Value Ref Range Status   12/01/2022 15.3 13.0 - 17.7 g/dL Final     Hematocrit   Date Value Ref Range Status   12/01/2022 46.3 37.5 - 51.0 % Final     MCV   Date Value Ref Range Status   12/01/2022 91.9 79.0 - 97.0 fL Final     MCH   Date Value Ref Range Status   12/01/2022 30.4 26.6 - 33.0 pg Final     MCHC   Date Value Ref Range Status   12/01/2022 33.0 31.5 - 35.7 g/dL Final     RDW   Date Value Ref Range Status   12/01/2022 12.9 12.3 - 15.4 % Final     RDW-SD   Date Value Ref Range Status   12/01/2022 43.7 37.0 - 54.0 fl Final     MPV   Date Value Ref Range Status   12/01/2022 10.3 6.0 - 12.0 fL Final     Platelets   Date Value Ref Range Status   12/01/2022 223 140 - 450 10*3/mm3 Final     Neutrophil %   Date Value Ref Range Status   12/01/2022 65.2 42.7 - 76.0 % Final     Lymphocyte %   Date Value Ref Range Status   12/01/2022 20.0 19.6 - 45.3 % Final     Monocyte %   Date Value Ref Range Status   12/01/2022 7.9 5.0 - 12.0 % Final     Eosinophil %   Date Value Ref Range Status   12/01/2022 5.7 0.3 - 6.2 % Final     Basophil %   Date Value Ref Range Status   12/01/2022 0.9 0.0 - 1.5 % Final     Immature Grans %   Date Value Ref Range Status   12/01/2022 0.3 0.0 - 0.5 % Final     Neutrophils, Absolute   Date Value Ref Range Status   12/01/2022 4.57 1.70 - 7.00 10*3/mm3 Final     Lymphocytes, Absolute   Date Value Ref Range Status   12/01/2022 1.40 0.70 - 3.10 10*3/mm3 Final     Monocytes, Absolute   Date Value  Ref Range Status   12/01/2022 0.55 0.10 - 0.90 10*3/mm3 Final     Eosinophils, Absolute   Date Value Ref Range Status   12/01/2022 0.40 0.00 - 0.40 10*3/mm3 Final     Basophils, Absolute   Date Value Ref Range Status   12/01/2022 0.06 0.00 - 0.20 10*3/mm3 Final     Immature Grans, Absolute   Date Value Ref Range Status   12/01/2022 0.02 0.00 - 0.05 10*3/mm3 Final     nRBC   Date Value Ref Range Status   12/01/2022 0.0 0.0 - 0.2 /100 WBC Final         Lab Results   Component Value Date    GLUCOSE 74 12/01/2022    BUN 8 12/01/2022    CREATININE 1.00 12/01/2022    EGFR 102.6 12/01/2022    BCR 8.0 12/01/2022    K 4.5 12/01/2022    CO2 28.9 12/01/2022    CALCIUM 9.6 12/01/2022    ALBUMIN 4.70 12/01/2022    BILITOT 0.4 12/01/2022    AST 28 12/01/2022    ALT 31 12/01/2022       Patient seen in no apparent distress.      PHYSICAL EXAM:     Foot/Ankle Exam    GENERAL  Appearance:  appears stated age  Orientation:  AAOx3  Affect:  appropriate  Gait:  unimpaired  Assistance:  independent  Right shoe gear: casual shoe  Left shoe gear: casual shoe    VASCULAR     Right Foot Vascularity   Normal vascular exam    Dorsalis pedis:  2+  Posterior tibial:  2+  Skin temperature:  warm  Edema grading:  None  CFT:  < 3 seconds  Pedal hair growth:  Present  Varicosities:  none     Left Foot Vascularity   Normal vascular exam    Dorsalis pedis:  2+  Posterior tibial:  2+  Skin temperature:  warm  Edema grading:  None  CFT:  < 3 seconds  Pedal hair growth:  Present  Varicosities:  none     NEUROLOGIC     Right Foot Neurologic   Normal sensation    Light touch sensation: normal  Vibratory sensation: normal  Hot/Cold sensation: normal  Protective Sensation using [place] Monofilament:   Sites intact: 10  Sites tested: 10     Left Foot Neurologic   Normal sensation    Light touch sensation: normal  Vibratory sensation: normal  Hot/Cold sensation:  normal  Protective Sensation using [place] Monofilament:   Sites intact:  10  Sites tested: 10    MUSCULOSKELETAL     Left Foot Musculoskeletal   Tenderness:  toe 1 tenderness    MUSCLE STRENGTH     Right Foot Muscle Strength   Foot dorsiflexion:  4  Foot plantar flexion:  4  Foot inversion:  4  Foot eversion:  4     Left Foot Muscle Strength   Foot dorsiflexion:  4  Foot plantar flexion:  4  Foot inversion:  4  Foot eversion:  4    RANGE OF MOTION     Right Foot Range of Motion   Foot and ankle ROM within normal limits       Left Foot Range of Motion   Foot and ankle ROM within normal limits      DERMATOLOGIC      Right Foot Dermatologic   Skin  Right foot skin is intact.      Left Foot Dermatologic   Skin  Positive for cellulitis and erythema. (Improving)       RADIOLOGY:      MRI Foot Left With & Without Contrast    Result Date: 3/8/2024  Narrative: PROCEDURE: MRI FOOT LEFT W WO CONTRAST  COMPARISON: [facility], CR, XR FOOT 3+ VW LEFT, 3/08/2024, 10:56.  INDICATIONS: Osteolytic changes in the proximal phalanx of hallux of big toe      TECHNIQUE: A complete multi-planar MRI of the foot was performed with and without intravenous gadolinium contrast.   FINDINGS:  There is edema like marrow signal throughout the great toe proximal phalanx.  No evidence of confluent T1 hypointense signal associated with this edema.  There is a large 1st IP joint effusion with evidence of synovitis.  There is also evidence for marginal erosive change along the distal medial aspect of the 1st proximal phalanx as seen on axial T1 image 18 and coronal T1 image 16, with intermediate signal in the adjacent medial soft tissues. Associated enhancement at this site.  No evidence of cutaneous defect.  No evidence of fracture.  No intermetatarsal neuroma or bursitis.  No acute plantar plate injury.  Normal appearance of the flexor and extensor tendons.  No significant fatty muscle atrophy.  Normal appearance of the partially visualized Lisfranc ligament.       Impression:  1st proximal phalanx marrow  edema with evidence for marginal erosive change medially as well as large joint effusion and intermediate signal in the adjacent medial soft tissues.  Overall, findings are suspicious for a deposition arthropathy such as gout.    ALEXANDRA LIZ MD       Electronically Signed and Approved By: ALEXANDRA LIZ MD on 3/08/2024 at 11:32             ASSESSMENT/PLAN     Diagnoses and all orders for this visit:    1. Acute gout involving toe of left foot, unspecified cause (Primary)  -     methylPREDNISolone (MEDROL) 4 MG dose pack; Take as directed  Dispense: 21 tablet; Refill: 0    2. Foot pain, left    Comprehensive lower extremity examination and evaluation was performed.    Discussed findings and treatment plan including risks, benefits, and treatment options with patient in detail. Patient agreed with treatment plan.    Medications and allergies reviewed.  Reviewed available lab values along with other pertinent labs.  These were discussed with the patient.    An After Visit Summary was printed and given to the patient at discharge, including (if requested) any available informative/educational handouts regarding diagnosis, treatment, or medications. All questions were answered to patient/family satisfaction. Should symptoms fail to improve or worsen they agree to call or return to clinic or to go to the Emergency Department. Discussed the importance of following up with any needed screening tests/labs/specialist appointments and any requested follow-up recommended by me today. Importance of maintaining follow-up discussed and patient accepts that missed appointments can delay diagnosis and potentially lead to worsening of conditions.    Return if symptoms worsen or fail to improve., or sooner if acute issues arise.    This document has been electronically signed by Howard Loera DPM on March 11, 2024 13:15 EDT

## 2024-03-13 ENCOUNTER — OFFICE VISIT (OUTPATIENT)
Dept: FAMILY MEDICINE CLINIC | Facility: CLINIC | Age: 34
End: 2024-03-13
Payer: MEDICAID

## 2024-03-13 VITALS
HEIGHT: 71 IN | OXYGEN SATURATION: 98 % | DIASTOLIC BLOOD PRESSURE: 86 MMHG | TEMPERATURE: 97.6 F | HEART RATE: 98 BPM | WEIGHT: 138.5 LBS | SYSTOLIC BLOOD PRESSURE: 132 MMHG | BODY MASS INDEX: 19.39 KG/M2

## 2024-03-13 DIAGNOSIS — M1A.0790 CHRONIC IDIOPATHIC GOUT INVOLVING TOE WITHOUT TOPHUS, UNSPECIFIED LATERALITY: Primary | ICD-10-CM

## 2024-03-13 RX ORDER — COLCHICINE 0.6 MG/1
0.6 TABLET ORAL DAILY
Qty: 30 TABLET | Refills: 0 | Status: SHIPPED | OUTPATIENT
Start: 2024-03-13

## 2024-03-13 RX ORDER — ALLOPURINOL 100 MG/1
100 TABLET ORAL DAILY
Qty: 90 TABLET | Refills: 1 | Status: SHIPPED | OUTPATIENT
Start: 2024-03-13

## 2024-03-13 NOTE — PROGRESS NOTES
"Ghulam Guzman presents to [facility] with complaints of persistent gout flares.      History of Present Illness  This is a 33-year-old male who presents to clinic with complaints of persistent gout flares.    Patient states that he has now had about 2 gout flares recently that it been very debilitating.  Has had gout flares in the past, would have them about every 3 to 4 months, but patient states that this time it has been the worst that he is ever experienced.  Has even seen podiatry, who at 1 point actually thought that he may have osteomyelitis, they did obtain an MRI quickly and did determine that it was just severe gout.  He is now currently on a second Medrol Dosepak, states that he is getting a little relief, but this time it was so severe that he almost wants to be on a medication to prevent this from the future.  Patient states that he has never taken a preventative medication before, but would like to try it as this has been very severe for him.  Denies any other new symptoms.    The following portions of the patient's history were personally reviewed and updated as appropriate: allergies, current medications, past medical history, past surgical history, past family history, and past social history.       Objective   Vital Signs:   /86 (BP Location: Left arm, Patient Position: Sitting, Cuff Size: Adult)   Pulse 98   Temp 97.6 °F (36.4 °C)   Ht 180.3 cm (71\")   Wt 62.8 kg (138 lb 8 oz)   SpO2 98%   BMI 19.32 kg/m²     Body mass index is 19.32 kg/m².    All labs, imaging, test results, and specialty provider notes reviewed with patient.     Physical Exam  Vitals reviewed.   Constitutional:       Appearance: Normal appearance.   Pulmonary:      Effort: Pulmonary effort is normal.   Musculoskeletal:         General: Swelling present.   Skin:     General: Skin is warm and dry.   Neurological:      General: No focal deficit present.      Mental Status: He is " alert and oriented to person, place, and time.                  BMI is within normal parameters. No other follow-up for BMI required.            Assessment and Plan:  Diagnoses and all orders for this visit:    1. Chronic idiopathic gout involving toe without tophus, unspecified laterality (Primary)  -     allopurinol (Zyloprim) 100 MG tablet; Take 1 tablet by mouth Daily.  Dispense: 90 tablet; Refill: 1  -     colchicine 0.6 MG tablet; Take 1 tablet by mouth Daily. Take 2 tablets at first sign of flare and additional 1 tablet 1 hour after initial flare. May then continue 1 tablet daily for 2-3 days after.  Dispense: 30 tablet; Refill: 0      Start on preventative medication, allopurinol.  Discussed this medication, use and side effects.  Will also provide patient with colchicine to use for acute flares that he may have in the future, discussed with patient his medication as well and when to take and how to take.  Finished Medrol Dosepak, will continue on allopurinol 100 mg, may need to increase in future.    Follow Up:  No follow-ups on file.    Patient was given instructions and counseling regarding his condition or for health maintenance advice. Please see specific information pulled into the AVS if appropriate.        Pounds Preamble Statement (Weight Entered In Details Tab): Reported Weight in pounds:

## 2024-04-09 ENCOUNTER — APPOINTMENT (OUTPATIENT)
Dept: GENERAL RADIOLOGY | Facility: HOSPITAL | Age: 34
End: 2024-04-09
Payer: MEDICAID

## 2024-04-09 ENCOUNTER — HOSPITAL ENCOUNTER (EMERGENCY)
Facility: HOSPITAL | Age: 34
Discharge: HOME | End: 2024-04-10
Attending: EMERGENCY MEDICINE | Admitting: EMERGENCY MEDICINE
Payer: MEDICAID

## 2024-04-09 ENCOUNTER — APPOINTMENT (OUTPATIENT)
Dept: CT IMAGING | Facility: HOSPITAL | Age: 34
End: 2024-04-09
Payer: MEDICAID

## 2024-04-09 VITALS
OXYGEN SATURATION: 98 % | SYSTOLIC BLOOD PRESSURE: 137 MMHG | HEIGHT: 71 IN | TEMPERATURE: 97.8 F | BODY MASS INDEX: 19.32 KG/M2 | HEART RATE: 79 BPM | DIASTOLIC BLOOD PRESSURE: 91 MMHG | RESPIRATION RATE: 18 BRPM

## 2024-04-09 DIAGNOSIS — F10.939 ALCOHOL WITHDRAWAL SYNDROME WITH COMPLICATION: Primary | ICD-10-CM

## 2024-04-09 DIAGNOSIS — R56.9 SEIZURE-LIKE ACTIVITY: ICD-10-CM

## 2024-04-09 LAB
ALBUMIN SERPL-MCNC: 4.8 G/DL (ref 3.5–5.2)
ALBUMIN/GLOB SERPL: 1.4 G/DL
ALP SERPL-CCNC: 105 U/L (ref 39–117)
ALT SERPL W P-5'-P-CCNC: 28 U/L (ref 1–41)
ANION GAP SERPL CALCULATED.3IONS-SCNC: 22.9 MMOL/L (ref 5–15)
ANION GAP SERPL CALCULATED.3IONS-SCNC: 29.6 MMOL/L (ref 5–15)
AST SERPL-CCNC: 37 U/L (ref 1–40)
BASOPHILS # BLD AUTO: 0.09 10*3/MM3 (ref 0–0.2)
BASOPHILS NFR BLD AUTO: 1 % (ref 0–1.5)
BILIRUB SERPL-MCNC: 0.7 MG/DL (ref 0–1.2)
BUN SERPL-MCNC: 8 MG/DL (ref 6–20)
BUN SERPL-MCNC: 9 MG/DL (ref 6–20)
BUN/CREAT SERPL: 10.1 (ref 7–25)
BUN/CREAT SERPL: 11.3 (ref 7–25)
CALCIUM SPEC-SCNC: 8.1 MG/DL (ref 8.6–10.5)
CALCIUM SPEC-SCNC: 9.4 MG/DL (ref 8.6–10.5)
CHLORIDE SERPL-SCNC: 100 MMOL/L (ref 98–107)
CHLORIDE SERPL-SCNC: 94 MMOL/L (ref 98–107)
CO2 SERPL-SCNC: 16.1 MMOL/L (ref 22–29)
CO2 SERPL-SCNC: 17.4 MMOL/L (ref 22–29)
CREAT SERPL-MCNC: 0.71 MG/DL (ref 0.76–1.27)
CREAT SERPL-MCNC: 0.89 MG/DL (ref 0.76–1.27)
DEPRECATED RDW RBC AUTO: 40.8 FL (ref 37–54)
EGFRCR SERPLBLD CKD-EPI 2021: 115.3 ML/MIN/1.73
EGFRCR SERPLBLD CKD-EPI 2021: 123.5 ML/MIN/1.73
EOSINOPHIL # BLD AUTO: 0.16 10*3/MM3 (ref 0–0.4)
EOSINOPHIL NFR BLD AUTO: 1.7 % (ref 0.3–6.2)
ERYTHROCYTE [DISTWIDTH] IN BLOOD BY AUTOMATED COUNT: 13.2 % (ref 12.3–15.4)
ETHANOL BLD-MCNC: 132 MG/DL (ref 0–10)
ETHANOL UR QL: 0.13 %
GLOBULIN UR ELPH-MCNC: 3.4 GM/DL
GLUCOSE SERPL-MCNC: 107 MG/DL (ref 65–99)
GLUCOSE SERPL-MCNC: 128 MG/DL (ref 65–99)
HCT VFR BLD AUTO: 46 % (ref 37.5–51)
HGB BLD-MCNC: 16.1 G/DL (ref 13–17.7)
HOLD SPECIMEN: NORMAL
HOLD SPECIMEN: NORMAL
IMM GRANULOCYTES # BLD AUTO: 0.03 10*3/MM3 (ref 0–0.05)
IMM GRANULOCYTES NFR BLD AUTO: 0.3 % (ref 0–0.5)
LIPASE SERPL-CCNC: 29 U/L (ref 13–60)
LYMPHOCYTES # BLD AUTO: 3.29 10*3/MM3 (ref 0.7–3.1)
LYMPHOCYTES NFR BLD AUTO: 34.8 % (ref 19.6–45.3)
MAGNESIUM SERPL-MCNC: 1.2 MG/DL (ref 1.6–2.6)
MCH RBC QN AUTO: 29.9 PG (ref 26.6–33)
MCHC RBC AUTO-ENTMCNC: 35 G/DL (ref 31.5–35.7)
MCV RBC AUTO: 85.5 FL (ref 79–97)
MONOCYTES # BLD AUTO: 0.43 10*3/MM3 (ref 0.1–0.9)
MONOCYTES NFR BLD AUTO: 4.6 % (ref 5–12)
NEUTROPHILS NFR BLD AUTO: 5.45 10*3/MM3 (ref 1.7–7)
NEUTROPHILS NFR BLD AUTO: 57.6 % (ref 42.7–76)
NRBC BLD AUTO-RTO: 0 /100 WBC (ref 0–0.2)
NT-PROBNP SERPL-MCNC: <36 PG/ML (ref 0–450)
PLATELET # BLD AUTO: 286 10*3/MM3 (ref 140–450)
PMV BLD AUTO: 8.6 FL (ref 6–12)
POTASSIUM SERPL-SCNC: 4.1 MMOL/L (ref 3.5–5.2)
POTASSIUM SERPL-SCNC: 4.2 MMOL/L (ref 3.5–5.2)
PROT SERPL-MCNC: 8.2 G/DL (ref 6–8.5)
RBC # BLD AUTO: 5.38 10*6/MM3 (ref 4.14–5.8)
SODIUM SERPL-SCNC: 139 MMOL/L (ref 136–145)
SODIUM SERPL-SCNC: 141 MMOL/L (ref 136–145)
TROPONIN T SERPL HS-MCNC: <6 NG/L
WBC NRBC COR # BLD AUTO: 9.45 10*3/MM3 (ref 3.4–10.8)
WHOLE BLOOD HOLD COAG: NORMAL
WHOLE BLOOD HOLD SPECIMEN: NORMAL

## 2024-04-09 PROCEDURE — 25810000003 SODIUM CHLORIDE 0.9 % SOLUTION: Performed by: EMERGENCY MEDICINE

## 2024-04-09 PROCEDURE — 70450 CT HEAD/BRAIN W/O DYE: CPT

## 2024-04-09 PROCEDURE — 25010000002 ONDANSETRON PER 1 MG

## 2024-04-09 PROCEDURE — 83880 ASSAY OF NATRIURETIC PEPTIDE: CPT

## 2024-04-09 PROCEDURE — 99284 EMERGENCY DEPT VISIT MOD MDM: CPT

## 2024-04-09 PROCEDURE — 82077 ASSAY SPEC XCP UR&BREATH IA: CPT | Performed by: EMERGENCY MEDICINE

## 2024-04-09 PROCEDURE — 83690 ASSAY OF LIPASE: CPT

## 2024-04-09 PROCEDURE — 96375 TX/PRO/DX INJ NEW DRUG ADDON: CPT

## 2024-04-09 PROCEDURE — 96365 THER/PROPH/DIAG IV INF INIT: CPT

## 2024-04-09 PROCEDURE — 80053 COMPREHEN METABOLIC PANEL: CPT

## 2024-04-09 PROCEDURE — 71045 X-RAY EXAM CHEST 1 VIEW: CPT

## 2024-04-09 PROCEDURE — 25010000002 LORAZEPAM PER 2 MG: Performed by: EMERGENCY MEDICINE

## 2024-04-09 PROCEDURE — 93005 ELECTROCARDIOGRAM TRACING: CPT

## 2024-04-09 PROCEDURE — 85025 COMPLETE CBC W/AUTO DIFF WBC: CPT

## 2024-04-09 PROCEDURE — 25010000002 MAGNESIUM SULFATE IN D5W 1G/100ML (PREMIX) 1-5 GM/100ML-% SOLUTION: Performed by: EMERGENCY MEDICINE

## 2024-04-09 PROCEDURE — 93010 ELECTROCARDIOGRAM REPORT: CPT | Performed by: INTERNAL MEDICINE

## 2024-04-09 PROCEDURE — 84484 ASSAY OF TROPONIN QUANT: CPT

## 2024-04-09 PROCEDURE — 83735 ASSAY OF MAGNESIUM: CPT

## 2024-04-09 PROCEDURE — 93005 ELECTROCARDIOGRAM TRACING: CPT | Performed by: EMERGENCY MEDICINE

## 2024-04-09 RX ORDER — ASPIRIN 81 MG/1
324 TABLET, CHEWABLE ORAL ONCE
Status: DISCONTINUED | OUTPATIENT
Start: 2024-04-09 | End: 2024-04-10 | Stop reason: HOSPADM

## 2024-04-09 RX ORDER — CHLORDIAZEPOXIDE HYDROCHLORIDE 25 MG/1
50 CAPSULE, GELATIN COATED ORAL ONCE
Status: COMPLETED | OUTPATIENT
Start: 2024-04-10 | End: 2024-04-10

## 2024-04-09 RX ORDER — MAGNESIUM SULFATE 1 G/100ML
1 INJECTION INTRAVENOUS ONCE
Status: COMPLETED | OUTPATIENT
Start: 2024-04-09 | End: 2024-04-09

## 2024-04-09 RX ORDER — CHLORDIAZEPOXIDE HYDROCHLORIDE 25 MG/1
CAPSULE, GELATIN COATED ORAL
Qty: 15 CAPSULE | Refills: 0 | Status: SHIPPED | OUTPATIENT
Start: 2024-04-09

## 2024-04-09 RX ORDER — SODIUM CHLORIDE 0.9 % (FLUSH) 0.9 %
10 SYRINGE (ML) INJECTION AS NEEDED
Status: DISCONTINUED | OUTPATIENT
Start: 2024-04-09 | End: 2024-04-10 | Stop reason: HOSPADM

## 2024-04-09 RX ORDER — ONDANSETRON 2 MG/ML
4 INJECTION INTRAMUSCULAR; INTRAVENOUS ONCE
Status: COMPLETED | OUTPATIENT
Start: 2024-04-09 | End: 2024-04-09

## 2024-04-09 RX ORDER — LORAZEPAM 2 MG/ML
1 INJECTION INTRAMUSCULAR ONCE
Status: COMPLETED | OUTPATIENT
Start: 2024-04-09 | End: 2024-04-09

## 2024-04-09 RX ADMIN — SODIUM CHLORIDE 1000 ML: 9 INJECTION, SOLUTION INTRAVENOUS at 21:29

## 2024-04-09 RX ADMIN — MAGNESIUM SULFATE HEPTAHYDRATE 1 G: 10 INJECTION, SOLUTION INTRAVENOUS at 21:29

## 2024-04-09 RX ADMIN — ONDANSETRON 4 MG: 2 INJECTION INTRAMUSCULAR; INTRAVENOUS at 21:00

## 2024-04-09 RX ADMIN — LORAZEPAM 1 MG: 2 INJECTION INTRAMUSCULAR; INTRAVENOUS at 22:31

## 2024-04-09 NOTE — ED PROVIDER NOTES
Time: 7:17 PM EDT  Date of encounter:  4/9/2024  Independent Historian/Clinical History and Information was obtained by:   Patient    History is limited by: N/A    Chief Complaint   Patient presents with    Chest Pain    Syncope    Numbness         History of Present Illness:  Patient is a 34 y.o. year old male who presents to the emergency department for evaluation of black out episode and chest pain. Per mother, patient started having symptoms on Friday but his symptom then was only tachycardia. Mom thought that it was only anxiety. Today, patient states that he was laying in bed today when he started shaking and everything turned dark. When he came to, he was still shaking. Denies any vomiting. He states that he has upper extremity numbness and some facial numbness that comes on intermittently. Patient report using THC and delta 8. Last use was on Friday. States that this has not happened to him before with delta 8. Mom states that his father passed away at 46yo due to MI. (Triage Provider: Ankur Altamirano PA-C).  Patient also reports daily alcohol use but states last drink was Sunday.  He has discuss help with alcohol withdrawal with his PCP.  He is not interested in inpatient treatment.    Patient Care Team  Primary Care Provider: Flora Franco APRN    Past Medical History:     Allergies   Allergen Reactions    Bee Venom Anaphylaxis     Past Medical History:   Diagnosis Date    Alcohol abuse      No past surgical history on file.  No family history on file.    Home Medications:  Prior to Admission medications    Medication Sig Start Date End Date Taking? Authorizing Provider   allopurinol (Zyloprim) 100 MG tablet Take 1 tablet by mouth Daily. 3/13/24   Flora Franco APRN   colchicine 0.6 MG tablet Take 1 tablet by mouth Daily. Take 2 tablets at first sign of flare and additional 1 tablet 1 hour after initial flare. May then continue 1 tablet daily for 2-3 days after. 3/13/24   Flora Franco APRN  "  methylPREDNISolone (MEDROL) 4 MG dose pack Take as directed 3/11/24   Howard Loera DPM   propranolol LA (INDERAL LA) 120 MG 24 hr capsule Take 1 capsule by mouth Daily.    Provider, MD Ondina   QUEtiapine (SEROquel) 25 MG tablet Take 1-2 tablets by mouth Every Night. 12/14/23   JoanSamuelTAMIKO kolb        Social History:   Social History     Tobacco Use    Smoking status: Never     Passive exposure: Never    Smokeless tobacco: Never   Vaping Use    Vaping status: Never Used   Substance Use Topics    Alcohol use: Not Currently     Alcohol/week: 6.0 standard drinks of alcohol     Types: 6 Cans of beer per week     Comment: Sober 39 days Stopped drinking 11/27/2022    Drug use: Never         Review of Systems:  Review of Systems   Constitutional:  Negative for chills and fever.   HENT:  Negative for congestion, ear pain and sore throat.    Eyes:  Negative for pain.   Respiratory:  Negative for cough, chest tightness and shortness of breath.    Cardiovascular:  Negative for chest pain.   Gastrointestinal:  Negative for abdominal pain, diarrhea, nausea and vomiting.   Genitourinary:  Negative for flank pain and hematuria.   Musculoskeletal:  Negative for joint swelling.   Skin:  Negative for pallor.   Neurological:  Negative for seizures and headaches.   Psychiatric/Behavioral:  Positive for agitation.    All other systems reviewed and are negative.       Physical Exam:  /91   Pulse 79   Temp 97.8 °F (36.6 °C) (Oral)   Resp 18   Ht 180.3 cm (71\")   SpO2 98%   BMI 19.32 kg/m²         Physical Exam  Constitutional:       Appearance: Normal appearance.   HENT:      Head: Normocephalic and atraumatic.      Nose: Nose normal.      Mouth/Throat:      Mouth: Mucous membranes are moist.   Eyes:      Extraocular Movements: Extraocular movements intact.      Conjunctiva/sclera: Conjunctivae normal.      Pupils: Pupils are equal, round, and reactive to light.   Cardiovascular:      Rate and Rhythm: " Normal rate and regular rhythm.      Pulses: Normal pulses.      Heart sounds: Normal heart sounds.   Pulmonary:      Effort: Pulmonary effort is normal.      Breath sounds: Normal breath sounds.   Abdominal:      General: There is no distension.      Palpations: Abdomen is soft.      Tenderness: There is no abdominal tenderness.   Musculoskeletal:         General: Normal range of motion.      Cervical back: Normal range of motion.   Skin:     General: Skin is warm and dry.      Capillary Refill: Capillary refill takes less than 2 seconds.   Neurological:      General: No focal deficit present.      Mental Status: He is alert and oriented to person, place, and time. Mental status is at baseline.   Psychiatric:         Mood and Affect: Mood normal. Mood is anxious.         Behavior: Behavior normal.                      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      The following orders were placed and all results were independently analyzed by me:  Orders Placed This Encounter   Procedures    XR Chest 1 View    CT Head Without Contrast    [place] Draw    High Sensitivity Troponin T    Comprehensive Metabolic Panel    Lipase    BNP    Magnesium    CBC Auto Differential    Ethanol    Basic Metabolic Panel    NPO Diet NPO Type: Strict NPO    Undress & Gown    Continuous Pulse Oximetry    Oxygen Therapy- Nasal Cannula; Titrate 1-6 LPM Per SpO2; 90 - 95%    ECG 12 Lead ED Triage Standing Order; Chest Pain    Insert Peripheral IV    CBC & Differential    Green Top (Gel)    Lavender Top    Gold Top - SST    Light Blue Top       Medications Given in the Emergency Department:  Medications   sodium chloride 0.9 % flush 10 mL (has no administration in time range)   aspirin chewable tablet 324 mg (324 mg Oral Not  Given 4/9/24 2102)   chlordiazePOXIDE (LIBRIUM) capsule 50 mg (has no administration in time range)   ondansetron (ZOFRAN) injection 4 mg (4 mg Intravenous Given 4/9/24 2100)   magnesium sulfate in D5W 1g/100mL (PREMIX) (0 g Intravenous Stopped 4/9/24 2230)   sodium chloride 0.9 % bolus 1,000 mL (0 mL Intravenous Stopped 4/9/24 2230)   LORazepam (ATIVAN) injection 1 mg (1 mg Intravenous Given 4/9/24 2231)        ED Course:    The patient was initially evaluated in the triage area where orders were placed. The patient was later dispositioned by Bernabe Cloud MD.      The patient was advised to stay for completion of workup which includes but is not limited to communication of labs and radiological results, reassessment and plan. The patient was advised that leaving prior to disposition by a provider could result in critical findings that are not communicated to the patient.     ED Course as of 04/10/24 0000   Tue Apr 09, 2024   1920 PROVIDER IN TRIAGE  Patient was evaluated by Ankur caban PA-C. Orders were placed and awaiting final results and disposition.   [MV]   2114 ECG 12 Lead ED Triage Standing Order; Chest Pain  Sinus rhythm with rate of 79.  Normal HI and QTc.  No acute ST elevation.  EKG interpreted by me. [LD]      ED Course User Index  [LD] Bernabe Cloud MD  [MV] Ankur Altamirano PA       Labs:    Lab Results (last 24 hours)       Procedure Component Value Units Date/Time    High Sensitivity Troponin T [[id number]]  (Normal) Collected: 04/09/24 1933    Specimen: Blood Updated: 04/09/24 2006     HS Troponin T <6 ng/L     Narrative:      High Sensitive Troponin T Reference Range:  <14.0 ng/L- Negative Female for AMI  <22.0 ng/L- Negative Male for AMI  >=14 - Abnormal Female indicating possible myocardial injury.  >=22 - Abnormal Male indicating possible myocardial injury.   Clinicians would have to utilize clinical acumen, EKG, Troponin, and serial changes to determine if it is an Acute  Myocardial Infarction or myocardial injury due to an underlying chronic condition.         CBC & Differential [[id number]]  (Abnormal) Collected: 04/09/24 1933    Specimen: Blood Updated: 04/09/24 1939    Narrative:      The following orders were created for panel order CBC & Differential.  Procedure                               Abnormality         Status                     ---------                               -----------         ------                     CBC Auto Differential[[id number]]        Abnormal            Final result                 Please view results for these tests on the individual orders.    Comprehensive Metabolic Panel [[id number]]  (Abnormal) Collected: 04/09/24 1933    Specimen: Blood Updated: 04/09/24 2006     Glucose 128 mg/dL      BUN 9 mg/dL      Creatinine 0.89 mg/dL      Sodium 141 mmol/L      Potassium 4.2 mmol/L      Chloride 94 mmol/L      CO2 17.4 mmol/L      Calcium 9.4 mg/dL      Total Protein 8.2 g/dL      Albumin 4.8 g/dL      ALT (SGPT) 28 U/L      AST (SGOT) 37 U/L      Alkaline Phosphatase 105 U/L      Total Bilirubin 0.7 mg/dL      Globulin 3.4 gm/dL      A/G Ratio 1.4 g/dL      BUN/Creatinine Ratio 10.1     Anion Gap 29.6 mmol/L      eGFR 115.3 mL/min/1.73     Narrative:      GFR Normal >60  Chronic Kidney Disease <60  Kidney Failure <15      Lipase [[id number]]  (Normal) Collected: 04/09/24 1933    Specimen: Blood Updated: 04/09/24 2006     Lipase 29 U/L     BNP [[id number]]  (Normal) Collected: 04/09/24 1933    Specimen: Blood Updated: 04/09/24 2004     proBNP <36.0 pg/mL     Narrative:      This assay is used as an aid in the diagnosis of individuals suspected of having heart failure. It can be used as an aid in the diagnosis of acute decompensated heart failure (ADHF) in patients presenting with signs and symptoms of ADHF to the emergency department (ED). In addition, NT-proBNP of <300 pg/mL indicates ADHF is not likely.    Age Range Result Interpretation  NT-proBNP  Concentration (pg/mL:      <50             Positive            >450                   Gray                 300-450                    Negative             <300    50-75           Positive            >900                  Gray                300-900                  Negative            <300      >75             Positive            >1800                  Gray                300-1800                  Negative            <300    Magnesium [[id number]]  (Abnormal) Collected: 04/09/24 1933    Specimen: Blood Updated: 04/09/24 2006     Magnesium 1.2 mg/dL     CBC Auto Differential [[id number]]  (Abnormal) Collected: 04/09/24 1933    Specimen: Blood Updated: 04/09/24 1939     WBC 9.45 10*3/mm3      RBC 5.38 10*6/mm3      Hemoglobin 16.1 g/dL      Hematocrit 46.0 %      MCV 85.5 fL      MCH 29.9 pg      MCHC 35.0 g/dL      RDW 13.2 %      RDW-SD 40.8 fl      MPV 8.6 fL      Platelets 286 10*3/mm3      Neutrophil % 57.6 %      Lymphocyte % 34.8 %      Monocyte % 4.6 %      Eosinophil % 1.7 %      Basophil % 1.0 %      Immature Grans % 0.3 %      Neutrophils, Absolute 5.45 10*3/mm3      Lymphocytes, Absolute 3.29 10*3/mm3      Monocytes, Absolute 0.43 10*3/mm3      Eosinophils, Absolute 0.16 10*3/mm3      Basophils, Absolute 0.09 10*3/mm3      Immature Grans, Absolute 0.03 10*3/mm3      nRBC 0.0 /100 WBC     Ethanol [[id number]]  (Abnormal) Collected: 04/09/24 1933    Specimen: Blood Updated: 04/09/24 2207     Ethanol 132 mg/dL      Ethanol % 0.132 %     Narrative:      Ethanol (Plasma)  <10 Essentially Negative    Toxic Concentrations           mg/dL    Flushing, slowing of reflexes    Impaired visual activity         Depression of CNS              >100  Possible Coma                  >300       Basic Metabolic Panel [[id number]]  (Abnormal) Collected: 04/09/24 2233    Specimen: Blood Updated: 04/09/24 2312     Glucose 107 mg/dL      BUN 8 mg/dL      Creatinine 0.71 mg/dL      Sodium 139 mmol/L      Potassium 4.1  mmol/L      Comment: Slight hemolysis detected by analyzer. Result may be falsely elevated.        Chloride 100 mmol/L      CO2 16.1 mmol/L      Calcium 8.1 mg/dL      BUN/Creatinine Ratio 11.3     Anion Gap 22.9 mmol/L      eGFR 123.5 mL/min/1.73     Narrative:      GFR Normal >60  Chronic Kidney Disease <60  Kidney Failure <15               Imaging:    XR Chest 1 View    Result Date: 4/9/2024   DATE OF EXAM: 4/9/2024 7:40 PM  PROCEDURE: XR CHEST 1 VW-  INDICATIONS: Chest Pain Triage Protocol  COMPARISON: No Comparisons Available  TECHNIQUE: [Portable chest radiograph]  FINDINGS: No consolidations or pleural effusions are observed. The cardiac silhouette is within normal limits for size. The mediastinum is unremarkable. No acute osseous abnormalities are seen.      1.  No evidence for acute cardiopulmonary process.   Electronically Signed By-Trell Chopra MD On:4/9/2024 7:54 PM      CT Head Without Contrast    Result Date: 4/9/2024  CT HEAD WO CONTRAST-  Date of Exam: 4/9/2024 7:37 PM  Indication: facial numbness, extremity numbness, seizure at home.  Comparison: None available.  Technique: Axial CT images were obtained of the head without contrast administration.  Reconstructed coronal and sagittal images were also obtained. Automated exposure control and iterative construction methods were used.   FINDINGS: There is no evidence for acute intracranial hemorrhage. No definitive acute focal ischemia is observed. There is no abnormal cerebral edema. There is no mass effect or midline shift. The ventricular system is nondilated. The basilar cisterns are patent. There is no evidence for displaced skull fracture. The paranasal sinuses and mastoid air cells are clear.      1.  No evidence for acute intracranial abnormality.    Electronically Signed By-Trell Chopra MD On:4/9/2024 7:52 PM         Differential Diagnosis and Discussion:      Chest Pain:  Based on the patient's signs and symptoms, I considered aortic  dissection, myocardial infaction, pulmonary embolism, cardiac tamponade, pericarditis, pneumothorax, musculoskeletal chest pain and other differential diagnosis as an etiology of the patient's chest pain.   Seizure: Differential diagnosis includes but is not limited to meningitis, hypoglycemia, electrolyte abnormalities, intracranial hemorrhage, toxin induced, and pseudoseizure.  Syncope: Differential diagnosis includes but is not limited to TIA, hyperventilation, aortic stenosis, pulmonary emboli, myocardial disease, bradycardia arrhythmia, heart block, tachyarrhythmia, vasovagal, orthostatic hypotension, ruptured AAA, aortic dissection, subarachnoid hemorrhage, seizure, hypoglycemia.    All labs were reviewed and interpreted by me.  All X-rays impressions were independently interpreted by me.  EKG was interpreted by me.  CT scan radiology impression was interpreted by me.    MDM  Number of Diagnoses or Management Options  Diagnosis management comments: Patient is afebrile nontoxic-appearing.  Vital signs are stable.  Patient does admit to daily alcohol use.  Patient could potentially have had a seizure.  He states last drink was yesterday morning.  Patient initially said Sunday.  Patient was given Ativan with improvement in symptoms.  Labs showed low magnesium 1.2.  Patient was given magnesium.  Anion gap was elevated this improved.  Patient is not interested in inpatient alcohol detox.  Discussed giving him prescription for Librium to help with alcohol withdrawal.  Family is at bedside and will help.  Patient is agreeable to trying Librium to help with alcohol withdrawal.  He agreed to return to the emergency department for any concerning symptoms.  Family will be staying with him over the next few days.  Discussed return precautions, discharge instructions and answered all his questions.       Amount and/or Complexity of Data Reviewed  Clinical lab tests: reviewed  Tests in the radiology section of CPT®:  reviewed  Review and summarize past medical records: yes  Independent visualization of images, tracings, or specimens: yes    Risk of Complications, Morbidity, and/or Mortality  Presenting problems: moderate  Management options: moderate                 Patient Care Considerations:    CT ABDOMEN AND PELVIS: I considered ordering a CT scan of the abdomen and pelvis however patient denies abdominal pain      Consultants/Shared Management Plan:    None    Social Determinants of Health:    Patient is independent, reliable, and has access to care.       Disposition and Care Coordination:    Discharged: I considered escalation of care by admitting this patient to the hospital, however patient does not want to be admitted at this time    I have explained the patient´s condition, diagnoses and treatment plan based on the information available to me at this time. I have answered questions and addressed any concerns. The patient has a good  understanding of the patient´s diagnosis, condition, and treatment plan as can be expected at this point. The vital signs have been stable. The patient´s condition is stable and appropriate for discharge from the emergency department.      The patient will pursue further outpatient evaluation with the primary care physician or other designated or consulting physician as outlined in the discharge instructions. They are agreeable to this plan of care and follow-up instructions have been explained in detail. The patient has received these instructions in written format and has expressed an understanding of the discharge instructions. The patient is aware that any significant change in condition or worsening of symptoms should prompt an immediate return to this or the closest emergency department or call to 911.  I have explained discharge medications and the need for follow up with the patient/caretakers. This was also printed in the discharge instructions. Patient was discharged with the  following medications and follow up:      Medication List        New Prescriptions      chlordiazePOXIDE 25 MG capsule  Commonly known as: LIBRIUM  Day 1: 50mg q6h; Day 2: 25mg q6h; Day 3: 25 q12h; Day 4: 25mg at night               Where to Get Your Medications        These medications were sent to [facility] [id number] - [place], [place] - [address] AT [facility] [address] - [phone]  - [phone]   [address], [facility] [address]      Phone: [phone]   chlordiazePOXIDE 25 MG capsule      Flora Franco, TAMIKO  [address] [id number] [phone]    In 1 day         Final diagnoses:   Alcohol withdrawal syndrome with complication   Seizure-like activity        ED Disposition       ED Disposition   Discharge    Condition   Stable    Comment   --               This medical record created using voice recognition software.             Bernabe Cloud MD  04/10/24 0001

## 2024-04-10 RX ADMIN — CHLORDIAZEPOXIDE HYDROCHLORIDE 50 MG: 25 CAPSULE ORAL at 00:14

## 2024-04-11 LAB
QT INTERVAL: 387 MS
QTC INTERVAL: 443 MS

## 2024-06-24 ENCOUNTER — OFFICE VISIT (OUTPATIENT)
Dept: FAMILY MEDICINE CLINIC | Facility: CLINIC | Age: 34
End: 2024-06-24
Payer: MEDICAID

## 2024-06-24 VITALS
HEART RATE: 74 BPM | BODY MASS INDEX: 19.71 KG/M2 | SYSTOLIC BLOOD PRESSURE: 128 MMHG | TEMPERATURE: 98.2 F | WEIGHT: 140.8 LBS | DIASTOLIC BLOOD PRESSURE: 80 MMHG | HEIGHT: 71 IN | OXYGEN SATURATION: 97 %

## 2024-06-24 DIAGNOSIS — R53.83 FATIGUE, UNSPECIFIED TYPE: Primary | ICD-10-CM

## 2024-06-24 DIAGNOSIS — E83.42 HYPOMAGNESEMIA: ICD-10-CM

## 2024-06-24 DIAGNOSIS — Z13.220 SCREENING FOR LIPID DISORDERS: ICD-10-CM

## 2024-06-24 DIAGNOSIS — E55.9 VITAMIN D DEFICIENCY: ICD-10-CM

## 2024-06-24 DIAGNOSIS — G25.0 ESSENTIAL TREMOR: ICD-10-CM

## 2024-06-24 DIAGNOSIS — Z23 NEED FOR TDAP VACCINATION: ICD-10-CM

## 2024-06-24 DIAGNOSIS — Z01.89 ROUTINE LAB DRAW: ICD-10-CM

## 2024-06-24 DIAGNOSIS — F10.982 ALCOHOL-INDUCED INSOMNIA: ICD-10-CM

## 2024-06-24 PROCEDURE — 99214 OFFICE O/P EST MOD 30 MIN: CPT

## 2024-06-24 PROCEDURE — 1160F RVW MEDS BY RX/DR IN RCRD: CPT

## 2024-06-24 PROCEDURE — 1159F MED LIST DOCD IN RCRD: CPT

## 2024-06-24 PROCEDURE — 1126F AMNT PAIN NOTED NONE PRSNT: CPT

## 2024-06-24 PROCEDURE — 90471 IMMUNIZATION ADMIN: CPT

## 2024-06-24 PROCEDURE — 90715 TDAP VACCINE 7 YRS/> IM: CPT

## 2024-06-24 RX ORDER — QUETIAPINE FUMARATE 50 MG/1
50-100 TABLET, FILM COATED ORAL NIGHTLY
Qty: 60 TABLET | Refills: 2 | Status: SHIPPED | OUTPATIENT
Start: 2024-06-24

## 2024-06-24 RX ORDER — PROPRANOLOL HYDROCHLORIDE 80 MG/1
80 CAPSULE, EXTENDED RELEASE ORAL DAILY
Qty: 90 CAPSULE | Refills: 1 | Status: SHIPPED | OUTPATIENT
Start: 2024-06-24

## 2024-06-24 NOTE — PROGRESS NOTES
Ghulamcristina Guzman presents to [facility] who presents to clinic for 6-month follow-up.      History of Present Illness  This is a 34-year-old male who presents to the clinic for 6-month follow-up.    Alcohol abuse: Patient states that he has now been sober for a month and a half.  States that he found that he cannot drink any alcohol at all without having adverse effects.  Was having a lot of gout flares, and found that it was even related to just 1 beer would cause him an extreme outbreak of gout.  Actually went to the ER back in April to help get him off of alcohol, which they were able to do and sent him home with some Librium and patient has completely been sober for a month and a half.  States that he is completely abstinent, is not planning on ever going back, and would like to try to come off of the propranolol because he feels like it is little bit too strong now that he is not shaking at all.  Was seeing neurology in regards to this, but would like for me to take over and try to wean off slowly from this.  Patient does still have a lot of insomnia, his insomnia has always been related to his alcohol use, but states that he would like to try something either stronger than the Seroquel ordered or something stronger to try to help with sleep as that is definitely a big impact.  Is hoping it would help with depression as well because he does feel like over the past couple of months has been more depressed and down.  Denies suicidal thoughts or ideation.    Patient also is wondering with the extreme fatigue if it is something that could be going on within his body system from the alcoholism.  Has been a while since he had a full panel of blood work and would like to have it further evaluated as his fatigue is pretty extreme.    The following portions of the patient's history were personally reviewed and updated as appropriate: allergies, current medications, past medical  "history, past surgical history, past family history, and past social history.       Objective   Vital Signs:   /80 (BP Location: Left arm, Patient Position: Sitting, Cuff Size: Adult)   Pulse 74   Temp 98.2 °F (36.8 °C)   Ht 180.3 cm (71\")   Wt 63.9 kg (140 lb 12.8 oz)   SpO2 97%   BMI 19.64 kg/m²     Body mass index is 19.64 kg/m².    All labs, imaging, test results, and specialty provider notes reviewed with patient.     Physical Exam  Vitals reviewed.   Constitutional:       Appearance: Normal appearance.   Cardiovascular:      Rate and Rhythm: Normal rate and regular rhythm.      Pulses: Normal pulses.      Heart sounds: Normal heart sounds.   Pulmonary:      Effort: Pulmonary effort is normal.      Breath sounds: Normal breath sounds.   Neurological:      General: No focal deficit present.      Mental Status: He is alert and oriented to person, place, and time.                  BMI is within normal parameters. No other follow-up for BMI required.            Assessment and Plan:  Diagnoses and all orders for this visit:    1. Fatigue, unspecified type (Primary)  Comments:  Obtain full panel blood work, treat based off results.  Likely vitamin/electrolyte deficient.  Orders:  -     Vitamin B12 & Folate; Future    2. Need for Tdap vaccination  -     Tdap Vaccine => 8yo IM (BOOSTRIX/ADACEL)    3. Essential tremor  Comments:  Will slowly start renal off propranolol.  Will start on 80 mg from 120, will then further adjust and decrease possibly to 60 at next visit.  Orders:  -     propranolol LA (Inderal LA) 80 MG 24 hr capsule; Take 1 capsule by mouth Daily.  Dispense: 90 capsule; Refill: 1    4. Alcohol-induced insomnia  Comments:  Will increase Seroquel as I hope it will also help with depression.  If not effective, consider trazodone.  Consider SSRI for depression. ER precautions.  Orders:  -     QUEtiapine (SEROquel) 50 MG tablet; Take 1-2 tablets by mouth Every Night.  Dispense: 60 tablet; Refill: " 2    5. Routine lab draw  -     Comprehensive Metabolic Panel; Future  -     CBC Auto Differential; Future  -     TSH Rfx On Abnormal To Free T4; Future  -     Urinalysis With Culture If Indicated -; Future    6. Screening for lipid disorders  -     Lipid Panel; Future    7. Vitamin D deficiency  -     Vitamin D,25-Hydroxy; Future    8. Hypomagnesemia  -     Magnesium; Future          Follow Up:  No follow-ups on file.    Patient was given instructions and counseling regarding his condition or for health maintenance advice. Please see specific information pulled into the AVS if appropriate.

## 2024-07-08 ENCOUNTER — LAB (OUTPATIENT)
Dept: LAB | Facility: HOSPITAL | Age: 34
End: 2024-07-08
Payer: MEDICAID

## 2024-07-08 DIAGNOSIS — E55.9 VITAMIN D DEFICIENCY: ICD-10-CM

## 2024-07-08 DIAGNOSIS — E83.42 HYPOMAGNESEMIA: ICD-10-CM

## 2024-07-08 DIAGNOSIS — Z13.220 SCREENING FOR LIPID DISORDERS: ICD-10-CM

## 2024-07-08 DIAGNOSIS — R53.83 FATIGUE, UNSPECIFIED TYPE: ICD-10-CM

## 2024-07-08 DIAGNOSIS — Z01.89 ROUTINE LAB DRAW: ICD-10-CM

## 2024-07-08 LAB
25(OH)D3 SERPL-MCNC: 37.4 NG/ML (ref 30–100)
ALBUMIN SERPL-MCNC: 4.2 G/DL (ref 3.5–5.2)
ALBUMIN/GLOB SERPL: 1.4 G/DL
ALP SERPL-CCNC: 91 U/L (ref 39–117)
ALT SERPL W P-5'-P-CCNC: 14 U/L (ref 1–41)
ANION GAP SERPL CALCULATED.3IONS-SCNC: 14 MMOL/L (ref 5–15)
AST SERPL-CCNC: 20 U/L (ref 1–40)
BASOPHILS # BLD AUTO: 0.07 10*3/MM3 (ref 0–0.2)
BASOPHILS NFR BLD AUTO: 1 % (ref 0–1.5)
BILIRUB SERPL-MCNC: 0.8 MG/DL (ref 0–1.2)
BILIRUB UR QL STRIP: NEGATIVE
BUN SERPL-MCNC: 7 MG/DL (ref 6–20)
BUN/CREAT SERPL: 7.5 (ref 7–25)
CALCIUM SPEC-SCNC: 9.5 MG/DL (ref 8.6–10.5)
CHLORIDE SERPL-SCNC: 100 MMOL/L (ref 98–107)
CHOLEST SERPL-MCNC: 199 MG/DL (ref 0–200)
CLARITY UR: CLEAR
CO2 SERPL-SCNC: 27 MMOL/L (ref 22–29)
COLOR UR: YELLOW
CREAT SERPL-MCNC: 0.93 MG/DL (ref 0.76–1.27)
DEPRECATED RDW RBC AUTO: 39 FL (ref 37–54)
EGFRCR SERPLBLD CKD-EPI 2021: 110.5 ML/MIN/1.73
EOSINOPHIL # BLD AUTO: 1.2 10*3/MM3 (ref 0–0.4)
EOSINOPHIL NFR BLD AUTO: 16.4 % (ref 0.3–6.2)
ERYTHROCYTE [DISTWIDTH] IN BLOOD BY AUTOMATED COUNT: 12.7 % (ref 12.3–15.4)
FOLATE SERPL-MCNC: 19.6 NG/ML (ref 4.78–24.2)
GLOBULIN UR ELPH-MCNC: 3.1 GM/DL
GLUCOSE SERPL-MCNC: 90 MG/DL (ref 65–99)
GLUCOSE UR STRIP-MCNC: NEGATIVE MG/DL
HCT VFR BLD AUTO: 44.9 % (ref 37.5–51)
HDLC SERPL-MCNC: 44 MG/DL (ref 40–60)
HGB BLD-MCNC: 15.4 G/DL (ref 13–17.7)
HGB UR QL STRIP.AUTO: NEGATIVE
HOLD SPECIMEN: NORMAL
IMM GRANULOCYTES # BLD AUTO: 0.02 10*3/MM3 (ref 0–0.05)
IMM GRANULOCYTES NFR BLD AUTO: 0.3 % (ref 0–0.5)
KETONES UR QL STRIP: ABNORMAL
LDLC SERPL CALC-MCNC: 119 MG/DL (ref 0–100)
LDLC/HDLC SERPL: 2.58 {RATIO}
LEUKOCYTE ESTERASE UR QL STRIP.AUTO: NEGATIVE
LYMPHOCYTES # BLD AUTO: 1.91 10*3/MM3 (ref 0.7–3.1)
LYMPHOCYTES NFR BLD AUTO: 26.2 % (ref 19.6–45.3)
MAGNESIUM SERPL-MCNC: 1.8 MG/DL (ref 1.6–2.6)
MCH RBC QN AUTO: 29.4 PG (ref 26.6–33)
MCHC RBC AUTO-ENTMCNC: 34.3 G/DL (ref 31.5–35.7)
MCV RBC AUTO: 85.9 FL (ref 79–97)
MONOCYTES # BLD AUTO: 0.48 10*3/MM3 (ref 0.1–0.9)
MONOCYTES NFR BLD AUTO: 6.6 % (ref 5–12)
NEUTROPHILS NFR BLD AUTO: 3.62 10*3/MM3 (ref 1.7–7)
NEUTROPHILS NFR BLD AUTO: 49.5 % (ref 42.7–76)
NITRITE UR QL STRIP: NEGATIVE
NRBC BLD AUTO-RTO: 0 /100 WBC (ref 0–0.2)
PH UR STRIP.AUTO: 6.5 [PH] (ref 5–8)
PLATELET # BLD AUTO: 254 10*3/MM3 (ref 140–450)
PMV BLD AUTO: 10.4 FL (ref 6–12)
POTASSIUM SERPL-SCNC: 3.8 MMOL/L (ref 3.5–5.2)
PROT SERPL-MCNC: 7.3 G/DL (ref 6–8.5)
PROT UR QL STRIP: NEGATIVE
RBC # BLD AUTO: 5.23 10*6/MM3 (ref 4.14–5.8)
SODIUM SERPL-SCNC: 141 MMOL/L (ref 136–145)
SP GR UR STRIP: 1.02 (ref 1–1.03)
TRIGL SERPL-MCNC: 207 MG/DL (ref 0–150)
TSH SERPL DL<=0.05 MIU/L-ACNC: 3.02 UIU/ML (ref 0.27–4.2)
UROBILINOGEN UR QL STRIP: ABNORMAL
VIT B12 BLD-MCNC: 1071 PG/ML (ref 211–946)
VLDLC SERPL-MCNC: 36 MG/DL (ref 5–40)
WBC NRBC COR # BLD AUTO: 7.3 10*3/MM3 (ref 3.4–10.8)

## 2024-07-08 PROCEDURE — 82607 VITAMIN B-12: CPT

## 2024-07-08 PROCEDURE — 36415 COLL VENOUS BLD VENIPUNCTURE: CPT

## 2024-07-08 PROCEDURE — 80050 GENERAL HEALTH PANEL: CPT

## 2024-07-08 PROCEDURE — 82306 VITAMIN D 25 HYDROXY: CPT

## 2024-07-08 PROCEDURE — 82746 ASSAY OF FOLIC ACID SERUM: CPT

## 2024-07-08 PROCEDURE — 81003 URINALYSIS AUTO W/O SCOPE: CPT

## 2024-07-08 PROCEDURE — 80061 LIPID PANEL: CPT

## 2024-07-08 PROCEDURE — 83735 ASSAY OF MAGNESIUM: CPT

## 2024-11-28 DIAGNOSIS — G25.0 ESSENTIAL TREMOR: ICD-10-CM

## 2024-12-02 RX ORDER — PROPRANOLOL HYDROCHLORIDE 80 MG/1
80 CAPSULE, EXTENDED RELEASE ORAL DAILY
Qty: 90 CAPSULE | Refills: 1 | Status: SHIPPED | OUTPATIENT
Start: 2024-12-02

## 2024-12-02 NOTE — TELEPHONE ENCOUNTER
UPCOMING APPTS  No upcoming appointments  LAST OFFICE VISIT - THIS DEPT  6/24/2024 Flora Franco, APRN

## 2025-04-03 ENCOUNTER — OFFICE VISIT (OUTPATIENT)
Dept: FAMILY MEDICINE CLINIC | Facility: CLINIC | Age: 35
End: 2025-04-03
Payer: MEDICAID

## 2025-04-03 ENCOUNTER — TELEPHONE (OUTPATIENT)
Dept: FAMILY MEDICINE CLINIC | Facility: CLINIC | Age: 35
End: 2025-04-03
Payer: MEDICAID

## 2025-04-03 ENCOUNTER — HOSPITAL ENCOUNTER (OUTPATIENT)
Dept: GENERAL RADIOLOGY | Facility: HOSPITAL | Age: 35
Discharge: HOME | End: 2025-04-03
Admitting: NURSE PRACTITIONER
Payer: MEDICAID

## 2025-04-03 VITALS
TEMPERATURE: 97.1 F | WEIGHT: 132.8 LBS | OXYGEN SATURATION: 98 % | DIASTOLIC BLOOD PRESSURE: 72 MMHG | HEART RATE: 83 BPM | HEIGHT: 71 IN | BODY MASS INDEX: 18.59 KG/M2 | SYSTOLIC BLOOD PRESSURE: 124 MMHG

## 2025-04-03 DIAGNOSIS — M25.561 ACUTE PAIN OF RIGHT KNEE: Primary | ICD-10-CM

## 2025-04-03 DIAGNOSIS — M25.561 ACUTE PAIN OF RIGHT KNEE: ICD-10-CM

## 2025-04-03 PROCEDURE — 99213 OFFICE O/P EST LOW 20 MIN: CPT | Performed by: NURSE PRACTITIONER

## 2025-04-03 PROCEDURE — 1160F RVW MEDS BY RX/DR IN RCRD: CPT | Performed by: NURSE PRACTITIONER

## 2025-04-03 PROCEDURE — 73562 X-RAY EXAM OF KNEE 3: CPT

## 2025-04-03 PROCEDURE — 1159F MED LIST DOCD IN RCRD: CPT | Performed by: NURSE PRACTITIONER

## 2025-04-03 PROCEDURE — 1125F AMNT PAIN NOTED PAIN PRSNT: CPT | Performed by: NURSE PRACTITIONER

## 2025-04-03 NOTE — PROGRESS NOTES
Chief Complaint  Leg Pain (Right knee to calf, outside, swollen, tender to touch, firm, feels like its spreading)    Subjective        Ghulam Guzman presents to [facility]  History of Present Illness  Has a history of gout.  Notes that has been doing okay.  Pain started today as a pinpoint but has been radiating out and now most of the joint but lasteral side slightly red.  Notes urgent care on review treated as an insect bite and placed on antibiotics.  Leg Pain   Pertinent negatives include no numbness.       The following portions of the patient's history were personally reviewed and updated as appropriate: allergies, current medications, past medical history, past surgical history, past family history, and past social history.     Body mass index is 18.53 kg/m².    BMI is within normal parameters. No other follow-up for BMI required.      Past History:    Medical History: has a past medical history of Alcohol abuse, Difficulty walking (2021), and Gout (2021).     Surgical History: has a past surgical history that includes Toenail excision (2020).     Family History: family history includes Early death in his father.     Social History: reports that he has never smoked. He has never been exposed to tobacco smoke. He has never used smokeless tobacco. He reports that he does not currently use alcohol after a past usage of about 6.0 standard drinks of alcohol per week. He reports that he does not use drugs.    Allergies: Bee venom          Current Outpatient Medications:     cephalexin (KEFLEX) 500 MG capsule, Take 1 capsule by mouth 3 (Three) Times a Day for 10 days., Disp: 30 capsule, Rfl: 0    colchicine 0.6 MG tablet, Take 1 tablet by mouth Daily. Take 2 tablets at first sign of flare and additional 1 tablet 1 hour after initial flare. May then continue 1 tablet daily for 2-3 days after., Disp: 30 tablet, Rfl: 0    propranolol LA (Inderal LA) 80 MG 24 hr capsule, Take 1  "capsule by mouth Daily., Disp: 90 capsule, Rfl: 1    allopurinol (Zyloprim) 100 MG tablet, Take 1 tablet by mouth Daily., Disp: 90 tablet, Rfl: 1    Medications Discontinued During This Encounter   Medication Reason    chlordiazePOXIDE (LIBRIUM) 25 MG capsule *Therapy completed    QUEtiapine (SEROquel) 50 MG tablet *Therapy completed         Review of Systems   Constitutional:  Negative for fever.   Respiratory:  Negative for cough and shortness of breath.    Cardiovascular:  Negative for chest pain, palpitations and leg swelling.   Neurological:  Negative for dizziness, weakness, numbness and headache.        Objective         Vitals:    04/03/25 1327   BP: 124/72   BP Location: Right arm   Patient Position: Sitting   Cuff Size: Adult   Pulse: 83   Temp: 97.1 °F (36.2 °C)   TempSrc: Temporal   SpO2: 98%   Weight: 60.2 kg (132 lb 12.8 oz)   Height: 180.3 cm (70.98\")     Body mass index is 18.53 kg/m².         Physical Exam  Vitals reviewed.   Constitutional:       Appearance: Normal appearance. He is well-developed.   HENT:      Head: Normocephalic and atraumatic.      Mouth/Throat:      Pharynx: No oropharyngeal exudate.   Eyes:      Conjunctiva/sclera: Conjunctivae normal.      Pupils: Pupils are equal, round, and reactive to light.   Cardiovascular:      Rate and Rhythm: Normal rate and regular rhythm.      Heart sounds: Normal heart sounds. No murmur heard.     No friction rub. No gallop.   Pulmonary:      Effort: Pulmonary effort is normal.      Breath sounds: Normal breath sounds. No wheezing or rhonchi.   Musculoskeletal:      Right knee: Tenderness present.        Legs:    Skin:     General: Skin is warm and dry.   Neurological:      Mental Status: He is alert and oriented to person, place, and time.   Psychiatric:         Mood and Affect: Mood and affect normal.         Behavior: Behavior normal.         Thought Content: Thought content normal.         Judgment: Judgment normal.             Result Review " :               Assessment and Plan     Diagnoses and all orders for this visit:    1. Acute pain of right knee (Primary)  -     XR Knee 3 View Right; Future      Restart colchicine to see if helps and may possibly start allopurinol        Follow Up     Return for Next scheduled follow up.    Patient was given instructions and counseling regarding his condition or for health maintenance advice. Please see specific information pulled into the AVS if appropriate.

## 2025-04-03 NOTE — LETTER
April 3, 2025                  To whom it may concern:      Ghulam Guzman, (1990)  is a patient of this practice. He has an injury to right knee that is causing pain to the point that he can't have clothing touch it.  It is increasing pain with movement of joint as well.  He has been evaluated twice and would benefit from having time off from jury duty to allow this to heal.          Thank you for your attention,        Nico MORRISON

## 2025-04-03 NOTE — TELEPHONE ENCOUNTER
Caller: Ghulam Guzman    Relationship to patient: Self    Best call back number: [phone]     Chief complaint: ACUTE SWOLLEN RIGHT KNEE    Type of visit: SAME DAY    Requested date: 4.3.25    Additional notes:PATIENT STATES HE WOKE UP WITH A SWOLLEN KNEE THAT WAS NOT LIKE THIS LAST NIGHT AND WOULD LIKE TO BE SEEN TODAY BY A PROVIDER

## 2025-04-29 ENCOUNTER — OFFICE VISIT (OUTPATIENT)
Dept: FAMILY MEDICINE CLINIC | Facility: CLINIC | Age: 35
End: 2025-04-29
Payer: MEDICAID

## 2025-04-29 VITALS
OXYGEN SATURATION: 97 % | HEIGHT: 71 IN | HEART RATE: 77 BPM | WEIGHT: 134.8 LBS | DIASTOLIC BLOOD PRESSURE: 60 MMHG | TEMPERATURE: 98.1 F | BODY MASS INDEX: 18.87 KG/M2 | SYSTOLIC BLOOD PRESSURE: 104 MMHG

## 2025-04-29 DIAGNOSIS — G47.00 INSOMNIA, UNSPECIFIED TYPE: Primary | ICD-10-CM

## 2025-04-29 RX ORDER — TRAZODONE HYDROCHLORIDE 50 MG/1
50 TABLET ORAL NIGHTLY
Qty: 30 TABLET | Refills: 0 | Status: SHIPPED | OUTPATIENT
Start: 2025-04-29 | End: 2025-05-29

## 2025-04-30 NOTE — PROGRESS NOTES
Chief Complaint  Insomnia (/)    Subjective      Ghulam Clarence Guzman is a 35 y.o. male who presents to [facility]     History of Present Illness  The patient presents for evaluation of depression and insomnia.    He has a long history of alcohol consumption spanning 15 years, which he has successfully ceased for the past 8 months. This cessation was accompanied by a period of depression. He is currently seeking alternative treatments for his sleep disturbances and depression or potential remedies for his RLS. He was started on Seroquel but began to have restless legs after starting this. He has not tried any other medications for his depression or sleep issues. He has attempted to manage his restless leg syndrome without prescription medication, ensuring adequate intake of magnesium and potassium, but these measures have not been effective. He is currently on propranolol for tremors, a residual effect of his alcohol use, which he plans to gradually discontinue as the tremors have significantly reduced. He was initially prescribed Seroquel, but it induced a severe reaction characterized by restless leg syndrome (RLS) and drowsiness. The RLS was localized to the groin area, the sensation extends from the thigh downwards. He discontinued Seroquel due to these side effects. Despite maintaining hydration as advised, he did not observe any improvement. He has also tried melatonin which proved ineffective. He has previously tried a sleep aid from [employer], which was ineffective at the recommended dose and caused hallucinations when the dose was tripled, he is no longer taking this medication.     SOCIAL HISTORY  He has a long history of drinking for 15 years but has been sober for 8 months. He has a family history of alcoholism on both his maternal and paternal sides.    FAMILY HISTORY  He has a family history of alcoholism on both his maternal and paternal sides.    MEDICATIONS  Current:  "propranolol  Discontinued: Seroquel, melatonin         Objective   Vital Signs:   Vitals:    04/29/25 1449   BP: 104/60   BP Location: Left arm   Patient Position: Sitting   Cuff Size: Adult   Pulse: 77   Temp: 98.1 °F (36.7 °C)   SpO2: 97%   Weight: 61.1 kg (134 lb 12.8 oz)   Height: 180.3 cm (70.98\")     Body mass index is 18.81 kg/m².    Wt Readings from Last 3 Encounters:   04/29/25 61.1 kg (134 lb 12.8 oz)   04/03/25 60.2 kg (132 lb 12.8 oz)   04/03/25 61.1 kg (134 lb 9.6 oz)     BP Readings from Last 3 Encounters:   04/29/25 104/60   04/03/25 124/72   04/03/25 123/77       Health Maintenance   Topic Date Due    COVID-19 Vaccine (3 - 2024-25 season) 09/01/2024    ANNUAL PHYSICAL  12/14/2024    INFLUENZA VACCINE  07/01/2025    LIPID PANEL  07/08/2025    TDAP/TD VACCINES (2 - Td or Tdap) 06/24/2034    HEPATITIS C SCREENING  Completed    Pneumococcal Vaccine 0-49  Aged Out       XR Knee 3 View Right  Result Date: 4/3/2025  Impression: Negative study Electronically Signed: Manolo Elmore MD  4/3/2025 4:33 PM EDT  Workstation ID: [id number]       Physical Exam  Vitals and nursing note reviewed.   HENT:      Head: Normocephalic and atraumatic.      Nose: Nose normal.      Mouth/Throat:      Mouth: Mucous membranes are moist.   Eyes:      Conjunctiva/sclera: Conjunctivae normal.      Pupils: Pupils are equal, round, and reactive to light.   Cardiovascular:      Rate and Rhythm: Normal rate and regular rhythm.      Pulses: Normal pulses.      Heart sounds: Normal heart sounds.   Pulmonary:      Effort: Pulmonary effort is normal.      Breath sounds: Normal breath sounds.   Musculoskeletal:         General: Normal range of motion.      Cervical back: Normal range of motion.   Skin:     General: Skin is warm and dry.   Neurological:      Mental Status: He is alert and oriented to person, place, and time.   Psychiatric:         Mood and Affect: Mood normal.         Behavior: Behavior normal.          Physical Exam  Vital " Signs  Weight is 135 pounds.       Result Review :  The following data was reviewed by: TAMIKO Appiah on 04/29/2025:         Procedures          ASSESSMENT/PLAN  There are no diagnoses linked to this encounter.    Assessment & Plan  1. Depression.  He has a long history of alcohol use and has been sober for 8 months. He started Seroquel for his depression and sleep issues. He is currently on propranolol for tremors, a residual effect of his alcohol use, which he plans to gradually discontinue as the tremors have significantly reduced. A prescription for trazodone 50 mg has been issued, with instructions to start with half a tablet and adjust the dosage based on tolerance and response. If he experiences any adverse effects, he is advised to reduce the dosage to half a tablet. If he experiences restless leg syndrome (RLS) while on trazodone, he should inform the provider so that medication can be considered as a potential treatment option.    2. Insomnia.  He has tried melatonin without success and other over-the-counter sleep medications with no improvements. Trazodone 50 mg has been prescribed to help with sleep onset and maintenance. He is advised to start with half a tablet and adjust as needed based on response and tolerance. If he experiences any adverse effects, he should reduce the dosage to half a tablet.       BMI is within normal parameters. No other follow-up for BMI required.       Ghulamcristina Guzman  reports that he has never smoked. He has never been exposed to tobacco smoke. He has never used smokeless tobacco.        FOLLOW UP  No follow-ups on file.  Patient was given instructions and counseling regarding his condition or for health maintenance advice. Please see specific information pulled into the AVS if appropriate.       TAMIKO Appiah  04/30/25  09:08 EDT    Patient or patient representative verbalized consent for the use of Ambient Listening during the visit with  Chasidy  TAMIKO Kathleen for chart documentation. 4/30/2025  09:06 EDT

## 2025-05-07 DIAGNOSIS — G25.0 ESSENTIAL TREMOR: ICD-10-CM

## 2025-05-07 RX ORDER — PROPRANOLOL HYDROCHLORIDE 80 MG/1
80 CAPSULE, EXTENDED RELEASE ORAL DAILY
Qty: 90 CAPSULE | Refills: 1 | Status: CANCELLED | OUTPATIENT
Start: 2025-05-07

## 2025-05-12 RX ORDER — PROPRANOLOL HYDROCHLORIDE 60 MG/1
60 CAPSULE, EXTENDED RELEASE ORAL DAILY
Qty: 30 CAPSULE | Refills: 0 | Status: SHIPPED | OUTPATIENT
Start: 2025-05-12 | End: 2025-06-11

## 2025-05-23 DIAGNOSIS — M1A.0790 CHRONIC IDIOPATHIC GOUT INVOLVING TOE WITHOUT TOPHUS, UNSPECIFIED LATERALITY: ICD-10-CM

## 2025-05-27 RX ORDER — ALLOPURINOL 100 MG/1
100 TABLET ORAL DAILY
Qty: 90 TABLET | Refills: 1 | Status: SHIPPED | OUTPATIENT
Start: 2025-05-27

## 2025-05-27 RX ORDER — COLCHICINE 0.6 MG/1
0.6 TABLET ORAL DAILY
Qty: 30 TABLET | Refills: 0 | Status: SHIPPED | OUTPATIENT
Start: 2025-05-27

## 2025-05-27 NOTE — TELEPHONE ENCOUNTER
Upcoming Appts  With Family Medicine (TAMIKO Santiago)  06/02/2025 at 1:30 PM  Last Office Visit - This Dept  4/29/2025 Chasidy Kathleen APRN

## 2025-06-05 RX ORDER — PREDNISONE 20 MG/1
20 TABLET ORAL 2 TIMES DAILY
Qty: 10 TABLET | Refills: 0 | Status: SHIPPED | OUTPATIENT
Start: 2025-06-05

## 2025-06-17 DIAGNOSIS — G25.0 ESSENTIAL TREMOR: ICD-10-CM

## 2025-06-17 RX ORDER — PROPRANOLOL HYDROCHLORIDE 80 MG/1
80 CAPSULE, EXTENDED RELEASE ORAL DAILY
Qty: 90 CAPSULE | Refills: 1 | OUTPATIENT
Start: 2025-06-17

## 2025-07-16 DIAGNOSIS — G25.0 ESSENTIAL TREMOR: ICD-10-CM

## 2025-07-16 RX ORDER — TRAZODONE HYDROCHLORIDE 50 MG/1
50 TABLET ORAL NIGHTLY
Qty: 30 TABLET | Refills: 0 | Status: SHIPPED | OUTPATIENT
Start: 2025-07-16 | End: 2025-08-15

## 2025-07-16 RX ORDER — PROPRANOLOL HYDROCHLORIDE 60 MG/1
60 CAPSULE, EXTENDED RELEASE ORAL DAILY
Qty: 30 CAPSULE | Refills: 0 | Status: SHIPPED | OUTPATIENT
Start: 2025-07-16 | End: 2025-08-15

## 2025-07-16 NOTE — TELEPHONE ENCOUNTER
Caller: ThomasGhulam    Relationship: Self    Best call back number: [phone]    Requested Prescriptions:   Requested Prescriptions     Pending Prescriptions Disp Refills    traZODone (DESYREL) 50 MG tablet 30 tablet 0     Sig: Take 1 tablet by mouth Every Night for 30 days.    propranolol LA (Inderal LA) 60 MG 24 hr capsule 30 capsule 0     Sig: Take 1 capsule by mouth Daily for 30 days.        Pharmacy where request should be sent: [facility] [id number]  [place] [id number] [facility] [address] [facility] [phone] [facility] [phone] FX     Last office visit with prescribing clinician: 6/24/2024   Last telemedicine visit with prescribing clinician: Visit date not found   Next office visit with prescribing clinician: Visit date not found     Additional details provided by patient: OUT PF TRAZODONE AND 1 DAY LEFT OF PROPRANOLOL.     Does the patient have less than a 3 day supply:  [x] Yes  [] No    Would you like a call back once the refill request has been completed: [] Yes [] No    If the office needs to give you a call back, can they leave a voicemail: [] Yes [] No    Debbi Mcgee Rep   07/16/25 15:39 EDT

## 2025-07-25 DIAGNOSIS — M1A.0790 CHRONIC IDIOPATHIC GOUT INVOLVING TOE WITHOUT TOPHUS, UNSPECIFIED LATERALITY: ICD-10-CM

## 2025-07-25 RX ORDER — COLCHICINE 0.6 MG/1
0.6 TABLET ORAL DAILY
Qty: 20 TABLET | Refills: 0 | Status: SHIPPED | OUTPATIENT
Start: 2025-07-25

## 2025-07-25 NOTE — TELEPHONE ENCOUNTER
Upcoming Appts  No upcoming appointments  Last Office Visit - This Dept  4/29/2025 Chasidy Kathleen APRN      Provider out of office today, informed pt and recommended he go to the urgent care for evaluation and treatment.

## 2025-07-25 NOTE — TELEPHONE ENCOUNTER
having gout flare up, office told him to go to UC. patient went to UC last time they gave him antibiotics. patient knows he just needs the colchicne

## 2025-07-29 ENCOUNTER — TELEPHONE (OUTPATIENT)
Dept: FAMILY MEDICINE CLINIC | Facility: CLINIC | Age: 35
End: 2025-07-29
Payer: MEDICAID

## 2025-07-29 NOTE — TELEPHONE ENCOUNTER
Colchicine 0.6MG tablets  status: PA Response - ApprovedCreated: July 25th, 2025 [id number]: July 29th, 2025

## 2025-08-12 DIAGNOSIS — G25.0 ESSENTIAL TREMOR: ICD-10-CM

## 2025-08-12 RX ORDER — TRAZODONE HYDROCHLORIDE 50 MG/1
50 TABLET ORAL NIGHTLY
Qty: 30 TABLET | Refills: 0 | Status: SHIPPED | OUTPATIENT
Start: 2025-08-12

## 2025-08-12 RX ORDER — PROPRANOLOL HYDROCHLORIDE 60 MG/1
60 CAPSULE, EXTENDED RELEASE ORAL DAILY
Qty: 30 CAPSULE | Refills: 0 | Status: SHIPPED | OUTPATIENT
Start: 2025-08-12 | End: 2025-09-11